# Patient Record
Sex: FEMALE | Race: BLACK OR AFRICAN AMERICAN | Employment: OTHER | URBAN - METROPOLITAN AREA
[De-identification: names, ages, dates, MRNs, and addresses within clinical notes are randomized per-mention and may not be internally consistent; named-entity substitution may affect disease eponyms.]

---

## 2017-06-28 ENCOUNTER — HOSPITAL ENCOUNTER (INPATIENT)
Age: 72
LOS: 3 days | Discharge: HOME | DRG: 286 | End: 2017-07-01
Attending: INTERNAL MEDICINE | Admitting: INTERNAL MEDICINE
Payer: MEDICARE

## 2017-06-28 ENCOUNTER — HOSPITAL ENCOUNTER (EMERGENCY)
Age: 72
Discharge: HOME | DRG: 286 | End: 2017-06-28
Attending: STUDENT IN AN ORGANIZED HEALTH CARE EDUCATION/TRAINING PROGRAM | Admitting: INTERNAL MEDICINE
Payer: MEDICARE

## 2017-06-28 ENCOUNTER — APPOINTMENT (OUTPATIENT)
Dept: GENERAL RADIOLOGY | Age: 72
DRG: 286 | End: 2017-06-28
Attending: STUDENT IN AN ORGANIZED HEALTH CARE EDUCATION/TRAINING PROGRAM
Payer: MEDICARE

## 2017-06-28 VITALS
DIASTOLIC BLOOD PRESSURE: 64 MMHG | HEART RATE: 72 BPM | OXYGEN SATURATION: 97 % | TEMPERATURE: 98.4 F | SYSTOLIC BLOOD PRESSURE: 129 MMHG | HEIGHT: 63 IN | BODY MASS INDEX: 26.4 KG/M2 | RESPIRATION RATE: 22 BRPM | WEIGHT: 149 LBS

## 2017-06-28 DIAGNOSIS — I42.0 DILATED CARDIOMYOPATHY (HCC): Chronic | ICD-10-CM

## 2017-06-28 DIAGNOSIS — R77.8 ELEVATED TROPONIN: Primary | ICD-10-CM

## 2017-06-28 DIAGNOSIS — I21.4 NSTEMI (NON-ST ELEVATED MYOCARDIAL INFARCTION) (HCC): Chronic | ICD-10-CM

## 2017-06-28 DIAGNOSIS — I50.23 ACUTE ON CHRONIC SYSTOLIC HEART FAILURE (HCC): Chronic | ICD-10-CM

## 2017-06-28 DIAGNOSIS — I48.21 PERMANENT ATRIAL FIBRILLATION (HCC): ICD-10-CM

## 2017-06-28 DIAGNOSIS — N18.9 CHRONIC RENAL FAILURE, UNSPECIFIED STAGE: Chronic | ICD-10-CM

## 2017-06-28 DIAGNOSIS — J44.9 CHRONIC OBSTRUCTIVE PULMONARY DISEASE, UNSPECIFIED COPD TYPE (HCC): Chronic | ICD-10-CM

## 2017-06-28 DIAGNOSIS — I50.9 ACUTE ON CHRONIC CONGESTIVE HEART FAILURE, UNSPECIFIED CONGESTIVE HEART FAILURE TYPE: ICD-10-CM

## 2017-06-28 PROBLEM — R07.9 CHEST PAIN: Status: ACTIVE | Noted: 2017-06-28

## 2017-06-28 LAB
ANION GAP BLD CALC-SCNC: 9 MMOL/L (ref 7–16)
APPEARANCE UR: NORMAL
APTT PPP: >110 SEC (ref 23.5–31.7)
ATRIAL RATE: 117 BPM
BASOPHILS # BLD AUTO: 0 K/UL (ref 0–0.2)
BASOPHILS # BLD: 0 % (ref 0–2)
BILIRUB UR QL: NEGATIVE
BNP SERPL-MCNC: 2055 PG/ML
BUN SERPL-MCNC: 22 MG/DL (ref 8–23)
CALCIUM SERPL-MCNC: 8.5 MG/DL (ref 8.3–10.4)
CALCULATED R AXIS, ECG10: 101 DEGREES
CALCULATED T AXIS, ECG11: -94 DEGREES
CHLORIDE SERPL-SCNC: 106 MMOL/L (ref 98–107)
CO2 SERPL-SCNC: 28 MMOL/L (ref 21–32)
COLOR UR: YELLOW
CREAT SERPL-MCNC: 1.14 MG/DL (ref 0.6–1)
DIAGNOSIS, 93000: NORMAL
DIFFERENTIAL METHOD BLD: ABNORMAL
EOSINOPHIL # BLD: 0.1 K/UL (ref 0–0.8)
EOSINOPHIL NFR BLD: 2 % (ref 0.5–7.8)
ERYTHROCYTE [DISTWIDTH] IN BLOOD BY AUTOMATED COUNT: 13.7 % (ref 11.9–14.6)
GLUCOSE SERPL-MCNC: 95 MG/DL (ref 65–100)
GLUCOSE UR STRIP.AUTO-MCNC: NEGATIVE MG/DL
HCT VFR BLD AUTO: 40.7 % (ref 35.8–46.3)
HGB BLD-MCNC: 12.8 G/DL (ref 11.7–15.4)
HGB UR QL STRIP: NEGATIVE
IMM GRANULOCYTES # BLD: 0 K/UL (ref 0–0.5)
IMM GRANULOCYTES NFR BLD AUTO: 0.4 % (ref 0–5)
INR PPP: 1.1 (ref 0.9–1.2)
KETONES UR QL STRIP.AUTO: NEGATIVE MG/DL
LEUKOCYTE ESTERASE UR QL STRIP.AUTO: NEGATIVE
LYMPHOCYTES # BLD AUTO: 30 % (ref 13–44)
LYMPHOCYTES # BLD: 2.3 K/UL (ref 0.5–4.6)
MCH RBC QN AUTO: 32.2 PG (ref 26.1–32.9)
MCHC RBC AUTO-ENTMCNC: 31.4 G/DL (ref 31.4–35)
MCV RBC AUTO: 102.5 FL (ref 79.6–97.8)
MONOCYTES # BLD: 0.6 K/UL (ref 0.1–1.3)
MONOCYTES NFR BLD AUTO: 7 % (ref 4–12)
NEUTS SEG # BLD: 4.6 K/UL (ref 1.7–8.2)
NEUTS SEG NFR BLD AUTO: 61 % (ref 43–78)
NITRITE UR QL STRIP.AUTO: NEGATIVE
PH UR STRIP: 6 [PH] (ref 5–9)
PLATELET # BLD AUTO: 214 K/UL (ref 150–450)
PMV BLD AUTO: 11.1 FL (ref 10.8–14.1)
POTASSIUM SERPL-SCNC: 5.5 MMOL/L (ref 3.5–5.1)
PROCALCITONIN SERPL-MCNC: 0.1 NG/ML
PROT UR STRIP-MCNC: NEGATIVE MG/DL
PROTHROMBIN TIME: 11.8 SEC (ref 9.6–12)
Q-T INTERVAL, ECG07: 524 MS
QRS DURATION, ECG06: 156 MS
QTC CALCULATION (BEZET), ECG08: 612 MS
RBC # BLD AUTO: 3.97 M/UL (ref 4.05–5.25)
SODIUM SERPL-SCNC: 143 MMOL/L (ref 136–145)
SP GR UR REFRACTOMETRY: 1.01 (ref 1–1.02)
TROPONIN I BLD-MCNC: 0.13 NG/ML (ref 0–0.08)
TROPONIN I SERPL-MCNC: 0.15 NG/ML (ref 0.02–0.05)
TROPONIN I SERPL-MCNC: 0.17 NG/ML (ref 0.02–0.05)
UROBILINOGEN UR QL STRIP.AUTO: 0.2 EU/DL (ref 0.2–1)
VENTRICULAR RATE, ECG03: 82 BPM
WBC # BLD AUTO: 7.7 K/UL (ref 4.3–11.1)

## 2017-06-28 PROCEDURE — 74011000250 HC RX REV CODE- 250: Performed by: NURSE PRACTITIONER

## 2017-06-28 PROCEDURE — 94640 AIRWAY INHALATION TREATMENT: CPT

## 2017-06-28 PROCEDURE — 74011250636 HC RX REV CODE- 250/636: Performed by: INTERNAL MEDICINE

## 2017-06-28 PROCEDURE — 74011250637 HC RX REV CODE- 250/637: Performed by: NURSE PRACTITIONER

## 2017-06-28 PROCEDURE — 65660000000 HC RM CCU STEPDOWN

## 2017-06-28 PROCEDURE — 84484 ASSAY OF TROPONIN QUANT: CPT | Performed by: INTERNAL MEDICINE

## 2017-06-28 PROCEDURE — 94760 N-INVAS EAR/PLS OXIMETRY 1: CPT

## 2017-06-28 PROCEDURE — 74011250636 HC RX REV CODE- 250/636: Performed by: NURSE PRACTITIONER

## 2017-06-28 PROCEDURE — 85730 THROMBOPLASTIN TIME PARTIAL: CPT | Performed by: INTERNAL MEDICINE

## 2017-06-28 PROCEDURE — 74011000250 HC RX REV CODE- 250: Performed by: INTERNAL MEDICINE

## 2017-06-28 PROCEDURE — C8929 TTE W OR WO FOL WCON,DOPPLER: HCPCS

## 2017-06-28 PROCEDURE — 77010033678 HC OXYGEN DAILY

## 2017-06-28 PROCEDURE — 36415 COLL VENOUS BLD VENIPUNCTURE: CPT | Performed by: INTERNAL MEDICINE

## 2017-06-28 PROCEDURE — 93005 ELECTROCARDIOGRAM TRACING: CPT | Performed by: INTERNAL MEDICINE

## 2017-06-28 RX ORDER — POTASSIUM CHLORIDE 750 MG/1
10 CAPSULE, EXTENDED RELEASE ORAL 2 TIMES DAILY
COMMUNITY

## 2017-06-28 RX ORDER — ACETAMINOPHEN 500 MG
1000 TABLET ORAL
Status: COMPLETED | OUTPATIENT
Start: 2017-06-28 | End: 2017-06-28

## 2017-06-28 RX ORDER — ALBUTEROL SULFATE 2.5 MG/.5ML
SOLUTION RESPIRATORY (INHALATION) ONCE
Status: ON HOLD | COMMUNITY
End: 2017-07-01

## 2017-06-28 RX ORDER — SODIUM CHLORIDE 0.9 % (FLUSH) 0.9 %
5-10 SYRINGE (ML) INJECTION AS NEEDED
Status: DISCONTINUED | OUTPATIENT
Start: 2017-06-28 | End: 2017-06-30 | Stop reason: SDUPTHER

## 2017-06-28 RX ORDER — METOPROLOL SUCCINATE 50 MG/1
50 TABLET, EXTENDED RELEASE ORAL DAILY
Status: DISCONTINUED | OUTPATIENT
Start: 2017-06-29 | End: 2017-07-01 | Stop reason: HOSPADM

## 2017-06-28 RX ORDER — HEPARIN SODIUM 5000 [USP'U]/100ML
12-25 INJECTION, SOLUTION INTRAVENOUS
Status: DISCONTINUED | OUTPATIENT
Start: 2017-06-28 | End: 2017-06-28

## 2017-06-28 RX ORDER — OXYCODONE HYDROCHLORIDE 5 MG/1
5 TABLET ORAL
Qty: 15 TAB | Refills: 0 | Status: SHIPPED | OUTPATIENT
Start: 2017-06-28

## 2017-06-28 RX ORDER — OXYCODONE HYDROCHLORIDE 5 MG/1
5 TABLET ORAL
Status: DISCONTINUED | OUTPATIENT
Start: 2017-06-28 | End: 2017-07-01 | Stop reason: HOSPADM

## 2017-06-28 RX ORDER — NITROGLYCERIN 0.4 MG/1
0.4 TABLET SUBLINGUAL
Status: COMPLETED | OUTPATIENT
Start: 2017-06-28 | End: 2017-06-28

## 2017-06-28 RX ORDER — BUDESONIDE 0.5 MG/2ML
INHALANT ORAL
Status: DISCONTINUED | OUTPATIENT
Start: 2017-06-28 | End: 2017-07-01 | Stop reason: HOSPADM

## 2017-06-28 RX ORDER — POTASSIUM CHLORIDE 750 MG/1
10 CAPSULE, EXTENDED RELEASE ORAL 2 TIMES DAILY
Status: DISCONTINUED | OUTPATIENT
Start: 2017-06-28 | End: 2017-06-28

## 2017-06-28 RX ORDER — METOPROLOL SUCCINATE 50 MG/1
50 TABLET, EXTENDED RELEASE ORAL DAILY
Status: ON HOLD | COMMUNITY
End: 2017-06-28

## 2017-06-28 RX ORDER — SODIUM CHLORIDE 0.9 % (FLUSH) 0.9 %
5-10 SYRINGE (ML) INJECTION EVERY 8 HOURS
Status: DISCONTINUED | OUTPATIENT
Start: 2017-06-28 | End: 2017-06-30 | Stop reason: SDUPTHER

## 2017-06-28 RX ORDER — FUROSEMIDE 40 MG/1
40 TABLET ORAL DAILY
COMMUNITY

## 2017-06-28 RX ORDER — GUAIFENESIN 100 MG/5ML
324 LIQUID (ML) ORAL
Status: COMPLETED | OUTPATIENT
Start: 2017-06-28 | End: 2017-06-28

## 2017-06-28 RX ORDER — HEPARIN SODIUM 5000 [USP'U]/100ML
12-25 INJECTION, SOLUTION INTRAVENOUS
Status: DISCONTINUED | OUTPATIENT
Start: 2017-06-28 | End: 2017-06-28 | Stop reason: HOSPADM

## 2017-06-28 RX ORDER — FUROSEMIDE 10 MG/ML
40 INJECTION INTRAMUSCULAR; INTRAVENOUS 2 TIMES DAILY
Status: DISCONTINUED | OUTPATIENT
Start: 2017-06-28 | End: 2017-06-29

## 2017-06-28 RX ORDER — BUDESONIDE AND FORMOTEROL FUMARATE DIHYDRATE 160; 4.5 UG/1; UG/1
2 AEROSOL RESPIRATORY (INHALATION) 2 TIMES DAILY
Status: ON HOLD | COMMUNITY
End: 2017-07-01

## 2017-06-28 RX ORDER — MORPHINE SULFATE 2 MG/ML
2 INJECTION, SOLUTION INTRAMUSCULAR; INTRAVENOUS
Status: DISCONTINUED | OUTPATIENT
Start: 2017-06-28 | End: 2017-07-01 | Stop reason: HOSPADM

## 2017-06-28 RX ORDER — FUROSEMIDE 10 MG/ML
40 INJECTION INTRAMUSCULAR; INTRAVENOUS
Status: COMPLETED | OUTPATIENT
Start: 2017-06-28 | End: 2017-06-28

## 2017-06-28 RX ORDER — FUROSEMIDE 40 MG/1
TABLET ORAL DAILY
COMMUNITY
End: 2017-06-28 | Stop reason: CLARIF

## 2017-06-28 RX ORDER — ALBUTEROL SULFATE 2.5 MG/.5ML
2.5 SOLUTION RESPIRATORY (INHALATION)
Status: DISCONTINUED | OUTPATIENT
Start: 2017-06-28 | End: 2017-06-28

## 2017-06-28 RX ORDER — HEPARIN SODIUM 5000 [USP'U]/100ML
12-25 INJECTION, SOLUTION INTRAVENOUS
Status: DISCONTINUED | OUTPATIENT
Start: 2017-06-28 | End: 2017-06-29

## 2017-06-28 RX ORDER — ALBUTEROL SULFATE 0.83 MG/ML
2.5 SOLUTION RESPIRATORY (INHALATION)
Status: DISCONTINUED | OUTPATIENT
Start: 2017-06-28 | End: 2017-06-30

## 2017-06-28 RX ORDER — HEPARIN SODIUM 5000 [USP'U]/ML
4000 INJECTION, SOLUTION INTRAVENOUS; SUBCUTANEOUS
Status: COMPLETED | OUTPATIENT
Start: 2017-06-28 | End: 2017-06-28

## 2017-06-28 RX ORDER — LIDOCAINE HYDROCHLORIDE 40 MG/ML
SOLUTION TOPICAL AS NEEDED
Status: DISCONTINUED | OUTPATIENT
Start: 2017-06-28 | End: 2017-06-28 | Stop reason: HOSPADM

## 2017-06-28 RX ORDER — PANTOPRAZOLE SODIUM 40 MG/1
40 TABLET, DELAYED RELEASE ORAL
Status: DISCONTINUED | OUTPATIENT
Start: 2017-06-29 | End: 2017-07-01 | Stop reason: HOSPADM

## 2017-06-28 RX ORDER — HYDROCODONE BITARTRATE AND ACETAMINOPHEN 5; 325 MG/1; MG/1
1 TABLET ORAL
COMMUNITY

## 2017-06-28 RX ORDER — ALBUTEROL SULFATE 90 UG/1
1 AEROSOL, METERED RESPIRATORY (INHALATION)
Status: ON HOLD | COMMUNITY
End: 2017-07-01

## 2017-06-28 RX ORDER — GUAIFENESIN 100 MG/5ML
81 LIQUID (ML) ORAL DAILY
Status: DISCONTINUED | OUTPATIENT
Start: 2017-06-29 | End: 2017-06-30

## 2017-06-28 RX ORDER — ATORVASTATIN CALCIUM 40 MG/1
40 TABLET, FILM COATED ORAL
Status: DISCONTINUED | OUTPATIENT
Start: 2017-06-28 | End: 2017-06-30

## 2017-06-28 RX ORDER — MEGESTROL ACETATE 40 MG/1
40 TABLET ORAL 4 TIMES DAILY
COMMUNITY
End: 2017-07-01

## 2017-06-28 RX ORDER — OMEPRAZOLE 20 MG/1
20 CAPSULE, DELAYED RELEASE ORAL DAILY
COMMUNITY

## 2017-06-28 RX ORDER — MEGESTROL ACETATE 40 MG/1
40 TABLET ORAL 4 TIMES DAILY
Status: DISCONTINUED | OUTPATIENT
Start: 2017-06-28 | End: 2017-06-28

## 2017-06-28 RX ORDER — METOPROLOL TARTRATE 50 MG/1
50 TABLET ORAL ONCE
COMMUNITY
End: 2017-07-01

## 2017-06-28 RX ORDER — ALBUTEROL SULFATE 90 UG/1
1 AEROSOL, METERED RESPIRATORY (INHALATION)
Status: DISCONTINUED | OUTPATIENT
Start: 2017-06-28 | End: 2017-07-01 | Stop reason: HOSPADM

## 2017-06-28 RX ORDER — IPRATROPIUM BROMIDE AND ALBUTEROL SULFATE 2.5; .5 MG/3ML; MG/3ML
3 SOLUTION RESPIRATORY (INHALATION)
Status: COMPLETED | OUTPATIENT
Start: 2017-06-28 | End: 2017-06-28

## 2017-06-28 RX ADMIN — ATORVASTATIN CALCIUM 40 MG: 40 TABLET, FILM COATED ORAL at 22:36

## 2017-06-28 RX ADMIN — METHYLPREDNISOLONE SODIUM SUCCINATE 125 MG: 125 INJECTION, POWDER, FOR SOLUTION INTRAMUSCULAR; INTRAVENOUS at 08:40

## 2017-06-28 RX ADMIN — ALBUTEROL SULFATE 2.5 MG: 2.5 SOLUTION RESPIRATORY (INHALATION) at 16:28

## 2017-06-28 RX ADMIN — NITROGLYCERIN 0.4 MG: 0.4 TABLET SUBLINGUAL at 09:27

## 2017-06-28 RX ADMIN — HEPARIN SODIUM AND DEXTROSE 12 UNITS/KG/HR: 5000; 5 INJECTION INTRAVENOUS at 11:10

## 2017-06-28 RX ADMIN — PERFLUTREN 1 ML: 6.52 INJECTION, SUSPENSION INTRAVENOUS at 14:00

## 2017-06-28 RX ADMIN — IPRATROPIUM BROMIDE AND ALBUTEROL SULFATE 3 ML: 2.5; .5 SOLUTION RESPIRATORY (INHALATION) at 07:45

## 2017-06-28 RX ADMIN — ACETAMINOPHEN 1000 MG: 500 TABLET, FILM COATED ORAL at 09:22

## 2017-06-28 RX ADMIN — NITROGLYCERIN 1 INCH: 20 OINTMENT TOPICAL at 17:32

## 2017-06-28 RX ADMIN — SACUBITRIL AND VALSARTAN 1 TABLET: 49; 51 TABLET, FILM COATED ORAL at 20:02

## 2017-06-28 RX ADMIN — ALBUTEROL SULFATE 2.5 MG: 2.5 SOLUTION RESPIRATORY (INHALATION) at 20:41

## 2017-06-28 RX ADMIN — HEPARIN SODIUM 4000 UNITS: 5000 INJECTION, SOLUTION INTRAVENOUS; SUBCUTANEOUS at 11:08

## 2017-06-28 RX ADMIN — NITROGLYCERIN 0.4 MG: 0.4 TABLET SUBLINGUAL at 09:21

## 2017-06-28 RX ADMIN — LIDOCAINE HYDROCHLORIDE: 40 SOLUTION TOPICAL at 08:10

## 2017-06-28 RX ADMIN — Medication 10 ML: at 11:50

## 2017-06-28 RX ADMIN — NITROGLYCERIN 0.4 MG: 0.4 TABLET SUBLINGUAL at 09:04

## 2017-06-28 RX ADMIN — ASPIRIN 81 MG 324 MG: 81 TABLET ORAL at 08:40

## 2017-06-28 RX ADMIN — FUROSEMIDE 40 MG: 10 INJECTION, SOLUTION INTRAMUSCULAR; INTRAVENOUS at 17:30

## 2017-06-28 RX ADMIN — BUDESONIDE 500 MCG: 0.5 SUSPENSION RESPIRATORY (INHALATION) at 20:41

## 2017-06-28 RX ADMIN — FUROSEMIDE 40 MG: 10 INJECTION, SOLUTION INTRAMUSCULAR; INTRAVENOUS at 09:22

## 2017-06-28 NOTE — ED PROVIDER NOTES
HPI     Past Medical History:   Diagnosis Date    CAD (coronary artery disease)     CHF     Chronic obstructive pulmonary disease ([facility] [address]     Diabetes ([facility] [address]     Hypertension     Ill-defined condition     Atrial Fibrillation     Ill-defined condition     Left Hemothorax     Ill-defined condition     Anemia        Past Surgical History:   Procedure Laterality Date    HX ORTHOPAEDIC      right foot    HX ORTHOPAEDIC      right hip replacement     HX PACEMAKER           History reviewed. No pertinent family history. Social History     Social History    Marital status:      Spouse name: N/A    Number of children: N/A    Years of education: N/A     Occupational History    Not on file. Social History Main Topics    Smoking status: Former Smoker    Smokeless tobacco: Never Used    Alcohol use No    Drug use: Not on file    Sexual activity: Not on file     Other Topics Concern    Not on file     Social History Narrative    No narrative on file         ALLERGIES: Aspirin    Review of Systems    Vitals:    06/28/17 0714 06/28/17 0745 06/28/17 0810 06/28/17 0904   BP: 118/72   126/64   Pulse: 78   79   Resp: 20      Temp: 98.1 °F (36.7 °C)      SpO2: 98% 97% 98%    Weight: 67.6 kg (149 lb)      Height: 5' 3\" (1.6 m)               Physical Exam     MDM  Number of Diagnoses or Management Options  Acute on chronic congestive heart failure, unspecified congestive heart failure type [facility]): new and requires workup  Elevated troponin: new and requires workup  Diagnosis management comments: Troponin elevation noted. EKG shows a paced rhythm without evidence of acute ischemic change. Occasional PVCs noted. BNP is also elevated consistent with patient's history of heart failure and likely exacerbation at this time. Chest x-ray shows cardiac enlargement but no acute failure, focal infiltrate or other abnormality.   Patient reports minimal improvement with DuoNeb therapy on arrival.  Lab reports slight hemolysis associated with mild potassium elevation. Discussed plan for admission with patient. Pending cardiology consult. 9:18 AM  Discussed patient case with on-call cardiologist to prefers patient be transferred to [facility] for further evaluation and admission. Requested initiation of heparin bolus at this time. Plenty care with patient to voices understanding and agreement.          Amount and/or Complexity of Data Reviewed  Clinical lab tests: ordered and reviewed  Tests in the radiology section of CPT®: ordered and reviewed  Tests in the medicine section of CPT®: ordered and reviewed  Independent visualization of images, tracings, or specimens: yes    Risk of Complications, Morbidity, and/or Mortality  Presenting problems: high  Diagnostic procedures: high  Management options: high    Patient Progress  Patient progress: stable    ED Course       Procedures

## 2017-06-28 NOTE — PROGRESS NOTES
Patient to room 320 from [facility]. Patient transferred bed via ambulation. Patient placed in gown and monitor leads applied. Weight taken. Vital signs stable and no acute distress noted. Gtts and lines verified and chart reviewed. Heparin dual verified at 12 units/ 16.2ml/hr. Care assumed of patient. Pt is alert and oriented x4 and follows commands appropriately. Pt reports some mild difficulty breathing and some generalized CP. LS reveal some bilateral lower crackles. EKG ordered and Mahin Ryan NP paged    Dual skin assessment with Bella Dsouza RN. Skin is intact with no breakdown noted. Heels and sacrum intact. Brusiing to bilateral arms noted. Pt repositioned in bed and turns self appropriately.

## 2017-06-28 NOTE — IP AVS SNAPSHOT
Arlyss Drought 
 
 
 145 [facility] 322 [facility] 
[phone] Patient: Maame Pérez MRN: [id number] You are allergic to the following Allergen Reactions Aspirin Nausea and Vomiting Recent Documentation Height Weight BMI OB Status Smoking Status 1.6 m 70.5 kg 27.55 kg/m2 Postmenopausal Former Smoker Emergency Contacts Name Discharge Info Relation Home Work Mobile Excell Libman  Daughter [21] [phone] About your hospitalization You were admitted on:  June 28, 2017 You last received care in the:  [facility] 3 TELEMETRY You were discharged on:  July 1, 2017 Unit phone number:  [phone] Why you were hospitalized Your primary diagnosis was:  Nstemi (Non-St Elevated Myocardial Infarction) (Hcc) Your diagnoses also included:  Diabetes (Hcc), Cardiomyopathy (Hcc), Copd (Chronic Obstructive Pulmonary Disease) (Hcc), Atrial Fibrillation (Hcc), Systolic Heart Failure (Hcc), Chest Pain, Chronic Renal Failure Providers Seen During Your Hospitalizations Provider Role Specialty Primary office phone Chepe León MD Attending Provider Cardiology [phone] Your Primary Care Physician (PCP) Primary Care Physician Office Phone Office Fax NONE ** None ** ** None ** Follow-up Information Follow up With Details Comments Contact Info Celeste cardiology Call 1-2 weeks with her regular cardiologists Dr. Ekta Jackman Pulmonologist  Call [facility] follow up, Please call office for appointment as soon as possible In [place] Current Discharge Medication List  
  
START taking these medications Dose & Instructions Dispensing Information Comments Morning Noon Evening Bedtime  
 metoprolol succinate 50 mg XL tablet Commonly known as:  TOPROL-XL Dose:  50 mg Take 1 Tab by mouth daily. Quantity:  30 Tab Refills:  11  
     
  
   
   
   
  
 predniSONE 10 mg tablet Commonly known as:  Oneyda Look Dose:  10 mg Take 1 Tab by mouth daily (with breakfast). 4 tabs x 3 days then 3 tabs x 3 days then 2 tabs x 3 days then 1 tab x 3 days then stop med Quantity:  30 Tab Refills:  0 CONTINUE these medications which have CHANGED Dose & Instructions Dispensing Information Comments Morning Noon Evening Bedtime * albuterol 90 mcg/actuation inhaler Commonly known as:  VENTOLIN HFA What changed:  Another medication with the same name was changed. Make sure you understand how and when to take each. Notes to Patient:  AS NEEDED Dose:  1 Puff Take 1 Puff by inhalation every four (4) hours as needed. Quantity:  1 Inhaler Refills:  5  
     
   
   
   
  
 * albuterol sulfate 2.5 mg/0.5 mL Nebu nebulizer solution Commonly known as:  PROVENTIL;VENTOLIN What changed:   
- how much to take - when to take this Dose:  2.5 mg  
0.5 mL by Nebulization route three (3) times daily for 30 days. Quantity:  45 mL Refills:  1  
     
  
   
  
   
   
  
  
 tiotropium 18 mcg inhalation capsule Commonly known as:  Juv AcessÃ³rios What changed:  Another medication with the same name was removed. Continue taking this medication, and follow the directions you see here. Dose:  1 Cap Take 1 Cap by inhalation daily. Quantity:  30 Cap Refills:  5  
     
  
   
   
   
  
 * Notice: This list has 2 medication(s) that are the same as other medications prescribed for you. Read the directions carefully, and ask your doctor or other care provider to review them with you. CONTINUE these medications which have NOT CHANGED Dose & Instructions Dispensing Information Comments Morning Noon Evening Bedtime  
 budesonide-formoterol 160-4.5 mcg/actuation HFA inhaler Commonly known as:  SYMBICORT  
   
 Dose:  2 Puff Take 2 Puffs by inhalation two (2) times a day. Quantity:  1 Inhaler Refills:  5 ELIQUIS 5 mg tablet Generic drug:  apixaban Dose:  5 mg Take 5 mg by mouth two (2) times a day. Refills:  0 ENTRESTO 49-51 mg Tab tablet Generic drug:  sacubitril-valsartan Dose:  1 Tab Take 1 Tab by mouth two (2) times a day. Refills:  0 HYDROcodone-acetaminophen 5-325 mg per tablet Commonly known as:  Kevon Bernal Notes to Patient:  AS NEEDED Dose:  1 Tab Take 1 Tab by mouth every six (6) hours as needed for Pain. Refills:  0  
     
   
   
   
  
 LASIX 40 mg tablet Generic drug:  furosemide Dose:  40 mg Take 40 mg by mouth daily. Refills:  0  
     
  
   
   
   
  
 omeprazole 20 mg capsule Commonly known as:  PRILOSEC Dose:  20 mg Take 20 mg by mouth daily. Take one capsule every day before meals Refills:  0  
     
  
   
   
   
  
 oxyCODONE IR 5 mg immediate release tablet Commonly known as:  Yolanda Lopez Notes to Patient:  AS NEEDED Dose:  5 mg Take 1 Tab by mouth every four (4) hours as needed for Pain for up to 15 doses. Max Daily Amount: 30 mg.  
 Quantity:  15 Tab Refills:  0  
     
   
   
   
  
 potassium chloride SA 10 mEq capsule Commonly known as:  Derek Novak Dose:  10 mEq Take 10 mEq by mouth two (2) times a day. Indications: take 2 tablest 2 times a day Refills:  0 STOP taking these medications   
 megestrol 40 mg tablet Commonly known as:  MEGACE  
   
  
 metoprolol tartrate 50 mg tablet Commonly known as:  LOPRESSOR Where to Get Your Medications Information on where to get these meds will be given to you by the nurse or doctor. ! Ask your nurse or doctor about these medications  
  albuterol 90 mcg/actuation inhaler albuterol sulfate 2.5 mg/0.5 mL Nebu nebulizer solution  
 budesonide-formoterol 160-4.5 mcg/actuation HFA inhaler  
 metoprolol succinate 50 mg XL tablet  
 predniSONE 10 mg tablet  
 tiotropium 18 mcg inhalation capsule Discharge Instructions Cardiac Catheterization/Angiography Discharge Instructions *Check the puncture site frequently for swelling or bleeding. If you see any bleeding, lie down and apply pressure over the area with a clean town or washcloth. Notify your doctor for any redness, swelling, drainage or oozing from the puncture site. Notify your doctor for any fever or chills. *If the leg or arm with the puncture becomes cold, numb or painful, call [facility] Cardiology at [phone] *Activity should be limited for the next 48 hours. Climb stairs as little as possible and avoid any stooping, bending or strenuous activity for 48 hours. No heavy lifting (anything over 10 pounds) for three days. *Do not drive for 48 hours. *You may resume your usual diet. Drink more fluids than usual. 
 
*Have a responsible person drive you home and stay with you for at least 24 hours after your heart catheterization/angiography. *You may remove the bandage from your Right Wrist in 24 hours. You may shower in 24 hours. No tub baths, hot tubs or swimming for one week. Do not place any lotions, creams, powders, ointments over the puncture site for one week. You may place a clean band-aid over the puncture site each day for 5 days. Change this daily. Chronic Obstructive Pulmonary Disease (COPD): Care Instructions Your Care Instructions Chronic obstructive pulmonary disease (COPD) is a general term for a group of lung diseases, including emphysema and chronic bronchitis. People with COPD have decreased airflow in and out of the lungs, which makes it hard to breathe. The airways also can get clogged with thick mucus. Cigarette smoking is a major cause of COPD. Although there is no cure for COPD, you can slow its progress. Following your treatment plan and taking care of yourself can help you feel better and live longer. Follow-up care is a key part of your treatment and safety. Be sure to make and go to all appointments, and call your doctor if you are having problems. It's also a good idea to know your test results and keep a list of the medicines you take. How can you care for yourself at home? Staying healthy · Do not smoke. This is the most important step you can take to prevent more damage to your lungs. If you need help quitting, talk to your doctor about stop-smoking programs and medicines. These can increase your chances of quitting for good. · Avoid colds and flu. Get a pneumococcal vaccine shot. If you have had one before, ask your doctor whether you need a second dose. Get the flu vaccine every fall. If you must be around people with colds or the flu, wash your hands often. · Avoid secondhand smoke, air pollution, and high altitudes. Also avoid cold, dry air and hot, humid air. Stay at home with your windows closed when air pollution is bad. Medicines and oxygen therapy · Take your medicines exactly as prescribed. Call your doctor if you think you are having a problem with your medicine. · You may be taking medicines such as: ¨ Bronchodilators. These help open your airways and make breathing easier. Bronchodilators are either short-acting (work for 6 to 9 hours) or long-acting (work for 24 hours). You inhale most bronchodilators, so they start to act quickly. Always carry your quick-relief inhaler with you in case you need it while you are away from home. ¨ Corticosteroids (prednisone, budesonide). These reduce airway inflammation. They come in pill or inhaled form. You must take these medicines every day for them to work well. · A spacer may help you get more inhaled medicine to your lungs.  Ask your doctor or pharmacist if a spacer is right for you. If it is, ask how to use it properly. · Do not take any vitamins, over-the-counter medicine, or herbal products without talking to your doctor first. 
· If your doctor prescribed antibiotics, take them as directed. Do not stop taking them just because you feel better. You need to take the full course of antibiotics. · Oxygen therapy boosts the amount of oxygen in your blood and helps you breathe easier. Use the flow rate your doctor has recommended, and do not change it without talking to your doctor first. 
Activity · Get regular exercise. Walking is an easy way to get exercise. Start out slowly, and walk a little more each day. · Pay attention to your breathing. You are exercising too hard if you cannot talk while you are exercising. · Take short rest breaks when doing household chores and other activities. · Learn breathing methodssuch as breathing through pursed lipsto help you become less short of breath. · If your doctor has not set you up with a pulmonary rehabilitation program, talk to him or her about whether rehab is right for you. Rehab includes exercise programs, education about your disease and how to manage it, help with diet and other changes, and emotional support. Diet · Eat regular, healthy meals. Use bronchodilators about 1 hour before you eat to make it easier to eat. Eat several small meals instead of three large ones. Drink beverages at the end of the meal. Avoid foods that are hard to chew. · Eat foods that contain protein so that you do not lose muscle mass. · Talk with your doctor if you gain too much weight or if you lose weight without trying. Mental health · Talk to your family, friends, or a therapist about your feelings. It is normal to feel frightened, angry, hopeless, helpless, and even guilty. Talking openly about bad feelings can help you cope. If these feelings last, talk to your doctor. When should you call for help? Call 911 anytime you think you may need emergency care. For example, call if: 
· You have severe trouble breathing. Call your doctor now or seek immediate medical care if: 
· You have new or worse trouble breathing. · You cough up blood. · You have a fever. Watch closely for changes in your health, and be sure to contact your doctor if: 
· You cough more deeply or more often, especially if you notice more mucus or a change in the color of your mucus. · You have new or worse swelling in your legs or belly. · You are not getting better as expected. Where can you learn more? Go to http://billy-braulio.info/. Maria Del Carmen Mejias in the search box to learn more about \"Chronic Obstructive Pulmonary Disease (COPD): Care Instructions. \" Current as of: March 25, 2017 Content Version: 11.3 © 0253-7540 [employer]. Care instructions adapted under license by [employer] (which disclaims liability or warranty for this information). If you have questions about a medical condition or this instruction, always ask your healthcare professional. [address] any warranty or liability for your use of this information. Dilated Cardiomyopathy: Care Instructions Your Care Instructions Dilated cardiomyopathy is a condition that weakens your heart muscle and causes it to stretch, or dilate. When your heart muscle is weak, it can't pump out blood as well as it should. More blood stays in your heart after each heartbeat. As more blood fills and stays in the heart, the heart muscle stretches even more and gets even weaker. Many things can cause dilated cardiomyopathy. It can be caused by another disease or condition, such as high blood pressure or a heart attack. Some people have a family history of dilated cardiomyopathy. For some people, the cause is not known.  
You may not have any symptoms at first. Or you may have mild symptoms, such as feeling very tired or weak. If your heart gets weaker, you may develop heart failure. Heart failure means that your heart muscle doesn't pump as much blood as your body needs. If this happens, you will feel other symptoms such as shortness of breath or trouble breathing when you lie down. The goal of treatment is to slow the disease and help you feel better. You may also have treatment for the cause of the cardiomyopathy. You will probably take a few medicines. If your doctor thinks it will help your heart and prevent problems, you may get a device such as a pacemaker. Self-care is another important part of your treatment. It includes the things you can do every day to feel better and stay as healthy as possible. Follow-up care is a key part of your treatment and safety. Be sure to make and go to all appointments, and call your doctor if you are having problems. It's also a good idea to know your test results and keep a list of the medicines you take. How can you care for yourself at home? Medicines · Be safe with medicines. Take your medicines exactly as prescribed. Call your doctor if you think you are having a problem with your medicine. You may be taking some of the following medicines: ¨ Angiotensin-converting enzyme (ACE) inhibitors or angiotensin II receptor blockers (ARBs). These make it easier for blood to flow. ¨ Diuretics. These help remove excess fluid from the body. ¨ Beta-blockers. These slow the heart rate and can help the heart fill with blood more completely. Heart-healthy lifestyle · Be active. Exercise regularly, but don't exercise too hard. If you aren't already active, your doctor may want you to start exercising. But don't start until you have talked with your doctor to make an exercise program that is safe for you. · Do not smoke. Smoking can make a heart condition worse. If you need help quitting, talk to your doctor about stop-smoking programs and medicines. These can increase your chances of quitting for good. · Eat a heart-healthy diet. · Stay at a healthy weight. Lose weight if you need to. · If your doctor recommends it, limit sodium. This helps keep fluid from building up in your body. It may help you feel better. Weight monitoring · Weigh yourself without clothing at the same time each day. Record your weight. Call your doctor if you have a sudden weight gain, such as more than 2 to 3 pounds in a day or 5 pounds in a week. (Your doctor may suggest a different range of weight gain.) A sudden weight gain may mean that your condition is getting worse. When should you call for help? Call 911 anytime you think you may need emergency care. For example, call if: 
· You have symptoms of sudden heart failure. These may include: ¨ Severe trouble breathing. ¨ A fast or irregular heartbeat. ¨ Coughing up pink, foamy mucus. ¨ Passing out. Call your doctor now or seek immediate medical care if: 
· You have new or changed symptoms of heart failure, such as: ¨ New or increased shortness of breath. ¨ New or worse swelling in your legs, ankles, or feet. ¨ Sudden weight gain, such as more than 2 to 3 pounds in a day or 5 pounds in a week. (Your doctor may suggest a different range of weight gain.) ¨ Feeling dizzy or lightheaded or like you may faint. ¨ Feeling so tired or weak that you cannot do your usual activities. ¨ Not sleeping well. Shortness of breath wakes you at night. You need extra pillows to prop yourself up to breathe easier. Watch closely for changes in your health, and be sure to contact your doctor if you have any problems. Where can you learn more? Go to http://billy-braulio.info/. Enter B164 in the search box to learn more about \"Dilated Cardiomyopathy: Care Instructions. \" Current as of: March 17, 2017 Content Version: 11.3 © 2719-1732 [employer].  Care instructions adapted under license by [address] (which disclaims liability or warranty for this information). If you have questions about a medical condition or this instruction, always ask your healthcare professional. [address] any warranty or liability for your use of this information. Avoiding Triggers With Heart Failure: Care Instructions Your Care Instructions Triggers are anything that make your heart failure flare up. A flare-up is also called \"sudden heart failure\" or \"acute heart failure. \" When you have a flare-up, fluid builds up in your lungs, and you have problems breathing. You might need to go to the hospital. By watching for changes in your condition and avoiding triggers, you can prevent heart failure flare-ups. Follow-up care is a key part of your treatment and safety. Be sure to make and go to all appointments, and call your doctor if you are having problems. It's also a good idea to know your test results and keep a list of the medicines you take. How can you care for yourself at home? Watch for changes in your weight and condition · Weigh yourself without clothing at the same time each day. Record your weight. Call your doctor if you have sudden weight gain, such as more than 2 to 3 pounds in a day or 5 pounds in a week. (Your doctor may suggest a different range of weight gain.) A sudden weight gain may mean that your heart failure is getting worse. · Keep a daily record of your symptoms. Write down any changes in how you feel, such as new shortness of breath, cough, or problems eating. Also record if your ankles are more swollen than usual and if you feel more tired than usual. Note anything that you ate or did that could have triggered these changes. Limit sodium Sodium causes your body to hold on to extra water. This may cause your heart failure symptoms to get worse.  People get most of their sodium from processed foods. Fast food and restaurant meals also tend to be very high in sodium. · Your doctor may suggest that you limit sodium to 2,000 milligrams (mg) a day or less. That is less than 1 teaspoon of salt a day, including all the salt you eat in cooking or in packaged foods. · Read food labels on cans and food packages. They tell you how much sodium you get in one serving. Check the serving size. If you eat more than one serving, you are getting more sodium. · Be aware that sodium can come in forms other than salt, including monosodium glutamate (MSG), sodium citrate, and sodium bicarbonate (baking soda). MSG is often added to Asian food. You can sometimes ask for food without MSG or salt. · Slowly reducing salt will help you adjust to the taste. Take the salt shaker off the table. · Flavor your food with garlic, lemon juice, onion, vinegar, herbs, and spices instead of salt. Do not use soy sauce, steak sauce, onion salt, garlic salt, mustard, or ketchup on your food, unless it is labeled \"low-sodium\" or \"low-salt. \" 
· Make your own salad dressings, sauces, and ketchup without adding salt. · Use fresh or frozen ingredients, instead of canned ones, whenever you can. Choose low-sodium canned goods. · Eat less processed food and food from restaurants, including fast food. Exercise as directed Moderate, regular exercise is very good for your heart. It improves your blood flow and helps control your weight. But too much exercise can stress your heart and cause a heart failure flare-up. · Check with your doctor before you start an exercise program. 
· Walking is an easy way to get exercise. Start out slowly. Gradually increase the length and pace of your walk. Swimming, riding a bike, and using a treadmill are also good forms of exercise. · When you exercise, watch for signs that your heart is working too hard.  You are pushing yourself too hard if you cannot talk while you are exercising. If you become short of breath or dizzy or have chest pain, stop, sit down, and rest. 
· Do not exercise when you do not feel well. Take medicines correctly · Take your medicines exactly as prescribed. Call your doctor if you think you are having a problem with your medicine. · Make a list of all the medicines you take. Include those prescribed to you by other doctors and any over-the-counter medicines, vitamins, or supplements you take. Take this list with you when you go to any doctor. · Take your medicines at the same time every day. It may help you to post a list of all the medicines you take every day and what time of day you take them. · Make taking your medicine as simple as you can. Plan times to take your medicines when you are doing other things, such as eating a meal or getting ready for bed. This will make it easier to remember to take your medicines. · Get organized. Use helpful tools, such as daily or weekly pill containers. When should you call for help? Call 911 if you have symptoms of sudden heart failure such as: 
· You have severe trouble breathing. · You cough up pink, foamy mucus. · You have a new irregular or rapid heartbeat. Call your doctor now or seek immediate medical care if: 
· You have new or increased shortness of breath. · You are dizzy or lightheaded, or you feel like you may faint. · You have sudden weight gain, such as more than 2 to 3 pounds in a day or 5 pounds in a week. (Your doctor may suggest a different range of weight gain.) · You have increased swelling in your legs, ankles, or feet. · You are suddenly so tired or weak that you cannot do your usual activities. Watch closely for changes in your health, and be sure to contact your doctor if you develop new symptoms. Where can you learn more? Go to http://billy-braulio.info/.  
Enter C291 in the search box to learn more about \"Avoiding Triggers With Heart Failure: Care Instructions. \" Current as of: February 23, 2017 Content Version: 11.3 © 9377-2594 [employer]. Care instructions adapted under license by [employer] (which disclaims liability or warranty for this information). If you have questions about a medical condition or this instruction, always ask your healthcare professional. [facility] [address] any warranty or liability for your use of this information. DISCHARGE SUMMARY from Nurse The following personal items are in your possession at time of discharge: 
 
Dental Appliances: None Visual Aid: Glasses, With patient Home Medications: None Jewelry: Bracelet, Other (comment), Earrings (Ankle braclet ) Clothing: Pants, Slippers, Socks, With patient Other Valuables: Cell Phone PATIENT INSTRUCTIONS: 
 
 
F-face looks uneven A-arms unable to move or move unevenly S-speech slurred or non-existent T-time-call 911 as soon as signs and symptoms begin-DO NOT go Back to bed or wait to see if you get better-TIME IS BRAIN. Warning Signs of HEART ATTACK Call 911 if you have these symptoms: 
? Chest discomfort. Most heart attacks involve discomfort in the center of the chest that lasts more than a few minutes, or that goes away and comes back. It can feel like uncomfortable pressure, squeezing, fullness, or pain. ? Discomfort in other areas of the upper body. Symptoms can include pain or discomfort in one or both arms, the back, neck, jaw, or stomach. ? Shortness of breath with or without chest discomfort. ? Other signs may include breaking out in a cold sweat, nausea, or lightheadedness. Don't wait more than five minutes to call [address]! Fast action can save your life. Calling 911 is almost always the fastest way to get lifesaving treatment.  Emergency Medical Services staff can begin treatment when they arrive  up to an hour sooner than if someone gets to the hospital by car. The discharge information has been reviewed with the patient. The patient verbalized understanding. Discharge medications reviewed with the patient and appropriate educational materials and side effects teaching were provided. Discharge Orders None O-CODES Announcement We are excited to announce that we are making your provider's discharge notes available to you in O-CODES. You will see these notes when they are completed and signed by the physician that discharged you from your recent hospital stay. If you have any questions or concerns about any information you see in O-CODES, please call the Health Information Department where you were seen or reach out to your Primary Care Provider for more information about your plan of care. Introducing [facility]! Johann Arceo introduces O-CODES patient portal. Now you can access parts of your medical record, email your doctor's office, and request medication refills online. 1. In your internet browser, go to [email or web address] 2. Click on the First Time User? Click Here link in the Sign In box. You will see the New Member Sign Up page. 3. Enter your O-CODES Access Code exactly as it appears below. You will not need to use this code after youve completed the sign-up process. If you do not sign up before the expiration date, you must request a new code. · O-CODES Access Code: [email or web address] Expires: 9/26/2017 11:52 AM 
 
4. Enter the last four digits of your Social Security Number (xxxx) and Date of Birth (mm/dd/yyyy) as indicated and click Submit. You will be taken to the next sign-up page. 5. Create a Healthcare [employer] ID. This will be your O-CODES login ID and cannot be changed, so think of one that is secure and easy to remember. 6. Create a O-CODES password. You can change your password at any time. 7. Enter your Password Reset Question and Answer. This can be used at a later time if you forget your password. 8. Enter your e-mail address. You will receive e-mail notification when new information is available in [address] 9. Click Sign Up. You can now view and download portions of your medical record. 10. Click the Download Summary menu link to download a portable copy of your medical information. If you have questions, please visit the Frequently Asked Questions section of the [employer] website. Remember, [employer] is NOT to be used for urgent needs. For medical emergencies, dial 911. Now available from your iPhone and Android! General Information Please provide this summary of care documentation to your next provider. Patient Signature:  ____________________________________________________________ Date:  ____________________________________________________________  
  
Rhina Covarrubias Provider Signature:  ____________________________________________________________ Date:  ____________________________________________________________

## 2017-06-28 NOTE — IP AVS SNAPSHOT
Current Discharge Medication List  
  
START taking these medications Dose & Instructions Dispensing Information Comments Morning Noon Evening Bedtime  
 metoprolol succinate 50 mg XL tablet Commonly known as:  TOPROL-XL Dose:  50 mg Take 1 Tab by mouth daily. Quantity:  30 Tab Refills:  11  
     
  
   
   
   
  
 predniSONE 10 mg tablet Commonly known as:  Jessy Smoker Dose:  10 mg Take 1 Tab by mouth daily (with breakfast). 4 tabs x 3 days then 3 tabs x 3 days then 2 tabs x 3 days then 1 tab x 3 days then stop med Quantity:  30 Tab Refills:  0 CONTINUE these medications which have CHANGED Dose & Instructions Dispensing Information Comments Morning Noon Evening Bedtime * albuterol 90 mcg/actuation inhaler Commonly known as:  VENTOLIN HFA What changed:  Another medication with the same name was changed. Make sure you understand how and when to take each. Notes to Patient:  AS NEEDED Dose:  1 Puff Take 1 Puff by inhalation every four (4) hours as needed. Quantity:  1 Inhaler Refills:  5  
     
   
   
   
  
 * albuterol sulfate 2.5 mg/0.5 mL Nebu nebulizer solution Commonly known as:  PROVENTIL;VENTOLIN What changed:   
- how much to take - when to take this Dose:  2.5 mg  
0.5 mL by Nebulization route three (3) times daily for 30 days. Quantity:  45 mL Refills:  1  
     
  
   
  
   
   
  
  
 tiotropium 18 mcg inhalation capsule Commonly known as:  Mailana What changed:  Another medication with the same name was removed. Continue taking this medication, and follow the directions you see here. Dose:  1 Cap Take 1 Cap by inhalation daily. Quantity:  30 Cap Refills:  5  
     
  
   
   
   
  
 * Notice: This list has 2 medication(s) that are the same as other medications prescribed for you.  Read the directions carefully, and ask your doctor or other care provider to review them with you. CONTINUE these medications which have NOT CHANGED Dose & Instructions Dispensing Information Comments Morning Noon Evening Bedtime  
 budesonide-formoterol 160-4.5 mcg/actuation HFA inhaler Commonly known as:  SYMBICORT Dose:  2 Puff Take 2 Puffs by inhalation two (2) times a day. Quantity:  1 Inhaler Refills:  5 ELIQUIS 5 mg tablet Generic drug:  apixaban Dose:  5 mg Take 5 mg by mouth two (2) times a day. Refills:  0 ENTRESTO 49-51 mg Tab tablet Generic drug:  sacubitril-valsartan Dose:  1 Tab Take 1 Tab by mouth two (2) times a day. Refills:  0 HYDROcodone-acetaminophen 5-325 mg per tablet Commonly known as:  Ailin Yen Notes to Patient:  AS NEEDED Dose:  1 Tab Take 1 Tab by mouth every six (6) hours as needed for Pain. Refills:  0  
     
   
   
   
  
 LASIX 40 mg tablet Generic drug:  furosemide Dose:  40 mg Take 40 mg by mouth daily. Refills:  0  
     
  
   
   
   
  
 omeprazole 20 mg capsule Commonly known as:  PRILOSEC Dose:  20 mg Take 20 mg by mouth daily. Take one capsule every day before meals Refills:  0  
     
  
   
   
   
  
 oxyCODONE IR 5 mg immediate release tablet Commonly known as:  Juan Jose Wilburn Notes to Patient:  AS NEEDED Dose:  5 mg Take 1 Tab by mouth every four (4) hours as needed for Pain for up to 15 doses. Max Daily Amount: 30 mg.  
 Quantity:  15 Tab Refills:  0  
     
   
   
   
  
 potassium chloride SA 10 mEq capsule Commonly known as:  Cj Sara Dose:  10 mEq Take 10 mEq by mouth two (2) times a day. Indications: take 2 tablest 2 times a day Refills:  0 STOP taking these medications   
 megestrol 40 mg tablet Commonly known as:  MEGACE  
   
  
 metoprolol tartrate 50 mg tablet Commonly known as:  LOPRESSOR Where to Get Your Medications Information on where to get these meds will be given to you by the nurse or doctor. ! Ask your nurse or doctor about these medications  
  albuterol 90 mcg/actuation inhaler  
 albuterol sulfate 2.5 mg/0.5 mL Nebu nebulizer solution  
 budesonide-formoterol 160-4.5 mcg/actuation HFA inhaler  
 metoprolol succinate 50 mg XL tablet  
 predniSONE 10 mg tablet  
 tiotropium 18 mcg inhalation capsule

## 2017-06-28 NOTE — PROGRESS NOTES
Problem: Falls - Risk of  Goal: *Absence of falls  Outcome: Progressing Towards Goal  Pt progressing towards goal. No falls since admission. Bed low and locked. Call light within reach. Side rails x 2. Gripper socks applied. Personal belongings within reach. Pt verbalizes understanding to call for assistance. Problem: Unstable angina/NSTEMI: Day of Admission/Day 1  Goal: *Optimal pain control at patients stated goal  Outcome: Progressing Towards Goal  CP free following supportive care.

## 2017-06-28 NOTE — ROUTINE PROCESS
TRANSFER - OUT REPORT:    Verbal report given to Antonio Santana RN (name) on Omayra Medrano  being [facility] room [facility] (unit) for routine progression of care       Report consisted of patients Situation, Background, Assessment and   Recommendations(SBAR). Information from the following report(s) ED Summary was reviewed with the receiving nurse. Lines:   Peripheral IV 06/28/17 Right Forearm (Active)       Peripheral IV 06/28/17 Right Antecubital (Active)        Opportunity for questions and clarification was provided.       Patient transported with:   [facility] with cardiac monitor, O2 @3L/min, Heparin infusion

## 2017-06-28 NOTE — ED NOTES
Received critical lab value from lab at [place], called [place] and talked to Ehsan Boss RN and advised her that the troponin was called back at 0.17.

## 2017-06-28 NOTE — H&P
[facility] CARDIOLOGY History &Physical                 Primary Cardiologist: In [place] possible [facility] Dr. Bambi Melendez    Primary Care Physician: In [place]    Admitting Physician: Dr. Amelia Clark:     Patient is a 73 y.o. female with prior h/o HTN,  A. Fib, cardiomyopathy (LHC several years ago but no intervention needed per patient but no records available) has [employer] ICD implanted approx 1 year ago, lung issues in past and COPD, and OA. Patient lives in [place] and visit her dgt in [place] when she developed SOB with cough, chills, diaphoresis that started approx 2 weeks ago. She thought she had a \"cold\". She reports increasing her daily lasix but no help, then today sx worse so went to ED at [place] for further treatment. She reports unable to lie flat for years but worse over last several days. She reports her chest pressure started after coughing spell on scale 10/10 post NTG 3/10. Now with laying still sx resolved. No weight gain or lower ext edema. In ED, labs showed initial trop . 13 now . 17 with BNP 2055. Past Medical History:   Diagnosis Date    Arthritis     Atrial fibrillation ([address]     Cardiomyopathy ([address]     Chronic obstructive pulmonary disease (HCC)     [facility]     Hypertension     Ill-defined condition     Anemia     Systolic heart failure (HCC)       Past Surgical History:   Procedure Laterality Date    HX IMPLANTABLE CARDIOVERTER DEFIBRILLATOR      HX ORTHOPAEDIC      right foot    HX ORTHOPAEDIC      right hip replacement     HX SHOULDER ARTHROSCOPY        Allergies   Allergen Reactions    Aspirin Nausea and Vomiting     Social History   Substance Use Topics    Smoking status: Former Smoker    Smokeless tobacco: Never Used    Alcohol use No      FH: No family history on file. Review of Systems   Constitution: Positive for chills and diaphoresis.  Negative for fever, weakness, malaise/fatigue, weight gain and weight loss. HENT: Negative for congestion and headaches. Cardiovascular: Positive for chest pain. Negative for claudication, cyanosis, dyspnea on exertion, irregular heartbeat, leg swelling, near-syncope, orthopnea, palpitations, paroxysmal nocturnal dyspnea and syncope. Respiratory: Positive for cough, shortness of breath and wheezing. Endocrine: Negative for cold intolerance and heat intolerance. Hematologic/Lymphatic: Negative for bleeding problem. Bruises/bleeds easily. Skin: Negative for nail changes. Neurological: Negative for dizziness.      ROS      Objective:       Visit Vitals    /62 (BP 1 Location: Left arm, BP Patient Position: At rest)    Pulse 72    Temp 97 °F (36.1 °C)    Resp 21    Ht 5' 3\" (1.6 m)    Wt 70.2 kg (154 lb 11.2 oz)    SpO2 100%    BMI 27.4 kg/m2               Physical Exam:  General: Well Developed, Well Nourished, No Acute Distress  HEENT: pupils equal and round, no abnormalities noted  Neck: supple, no JVD, no carotid bruits  Heart: S1S2 with RRR with + 2/6  murmur  Lungs: Clear throughout auscultation bilaterally without adventitious sounds  Abd: soft, nontender, nondistended, with good bowel sounds  Ext: warm, no edema, calves supple/nontender, pulses 2+ bilaterally  Skin: warm and dry  Psychiatric: Normal mood and affect  Neurologic: Alert and oriented X 3      ECG: paced    Data Review:   Recent Labs      06/28/17   1115  06/28/17   0820  06/28/17   0810  06/28/17   0758  06/28/17   0755   NA   --    --    --    --   143   K   --    --    --    --   5.5*   BUN   --    --    --    --   22   CREA   --    --    --    --   1.14*   GLU   --    --    --    --   95   WBC   --   7.7   --    --    --    HGB   --   12.8   --    --    --    HCT   --   40.7   --    --    --    PLT   --   214   --    --    --    INR   --    --   1.1   --    --    TNIPOC   --    --    --   0.13*   --    TROIQ  0.17*   --    --    --    --          CXR: Cardiac silhouette enlarged but no evidence of active failure seen    Assessment/Plan:   Principal Problem:    NSTEMI (non-ST elevated myocardial infarction) ([facility] (6/28/2017)- admit to tele, serial troponins, Echo, LHC when able to lie flat. Will add asa, statin, and change BB to toprol (approved for CHF, prior on lopressor 50mg daily). Continue heparin. Active Problems:    Cardiomyopathy [facility]) ()- has [employer]al[employer] ICD, notified device company for interrogation today. Will continue entresto, BB and change po lasix to IV lasix 40 mg bid with daily labs. Echo. COPD (chronic obstructive pulmonary disease) ([facility] [facility] ()- continue home meds for now. Atrial fibrillation ([facility] ()- stopped eliquis, on hep gtt and change BB as above. Systolic heart failure (HCC) ()- see above. Chest pain (6/28/2017)- see above. Ana Ham NP  6/28/2017  1:39 PM    Patient seen and examined by me. Agree with above note by physician extender. Key findings are:  sCHF and CP/dyspnea. CV- RRR without MRG  Lungs- Clear bilaterally  Abdomen- soft, non-tender, normal bowel sounds  Extremities- no edema    Plan:  IV diuresis and would consider LHC once stabilizes.         Keo Aponte MD

## 2017-06-28 NOTE — PROGRESS NOTES
Pt c/o continual CP, mid chest. Negative for diaphoresis and skin changes. SOB unchanged from previous. Pt at this time refusing to take any SL NTG stating taht it gave her a headache.

## 2017-06-28 NOTE — IP AVS SNAPSHOT
Summary of Care Report The Summary of Care report has been created to help improve care coordination. Users with access to [employer] or [address] (Web-based application) may access additional patient information including the Discharge Summary. If you are not currently a [address] user and need more information, please call the number listed below in the [place] [address] section and ask to be connected with Medical Records. Facility Information Name Address Phone [address] [address] [phone] Patient Information Patient Name Sex  Dyan Cuellar ([id number]) Female 1945 Discharge Information Admitting Provider Service Area Unit Adonis Gonzalez MD / Percy VALENTIN 935 3 Telemetry / [phone] Discharge Provider Discharge Date/Time Discharge Disposition Destination Adonis Gonzalez MD / [phone] 2017 Midday (Pending) AHR (none) Patient Language Language ENGLISH [13] Hospital Problems as of 2017  Reviewed: 2017 12:10 PM by Elsa Curiel NP Class Noted - Resolved Last Modified POA Active Problems Cardiomyopathy ([address] (Chronic)  Unknown - Present 2017 by Elsa Curiel NP Yes Entered by Evelyn Cisneros NP  
  COPD (chronic obstructive pulmonary disease) ([address] (Chronic)  Unknown - Present 2017 by Elsa Curiel NP Yes Entered by Evelyn Cisneros NP Atrial fibrillation ([address]  Unknown - Present 2017 by Evelyn Cisneros NP Yes Entered by Evelyn Cisneros NP Systolic heart failure (HCC) (Chronic)  Unknown - Present 2017 by Elsa Curiel NP Yes Entered by Evelyn Cisneros NP Chest pain  2017 - Present 2017 by Evelyn Cisneros NP Yes   Entered by Evelyn Cisneros NP  
 * (Principal)NSTEMI (non-ST elevated myocardial infarction) ([facility] (Chronic)  6/28/2017 - Present 6/30/2017 by Claudio Espinal NP Yes Entered by Ilia Gonzalez NP Chronic renal failure (Chronic)  6/30/2017 - Present 6/30/2017 by Claudio Espinal NP Yes Entered by Claudio Espinal NP Non-Hospital Problems as of 7/1/2017  Reviewed: 6/30/2017 12:10 PM by Claudio Espinal NP Class Noted - Resolved Last Modified Active Problems CHF (congestive heart failure) ([facility]  6/28/2017 - Present 6/28/2017 by Glenard Burkitt, MD  
  Entered by Glenard Burkitt, MD  
  Arthritis  Unknown - Present 6/28/2017 by Ilia Gonzalez NP Entered by Ilia Gonzalez NP You are allergic to the following Allergen Reactions Aspirin Nausea and Vomiting Current Discharge Medication List  
  
START taking these medications Dose & Instructions Dispensing Information Comments  
 metoprolol succinate 50 mg XL tablet Commonly known as:  TOPROL-XL Dose:  50 mg Take 1 Tab by mouth daily. Quantity:  30 Tab Refills:  11  
   
 predniSONE 10 mg tablet Commonly known as:  Slava Noss Dose:  10 mg Take 1 Tab by mouth daily (with breakfast). 4 tabs x 3 days then 3 tabs x 3 days then 2 tabs x 3 days then 1 tab x 3 days then stop med Quantity:  30 Tab Refills:  0 CONTINUE these medications which have CHANGED Dose & Instructions Dispensing Information Comments * albuterol 90 mcg/actuation inhaler Commonly known as:  VENTOLIN HFA What changed:  Another medication with the same name was changed. Make sure you understand how and when to take each. Notes to Patient:  AS NEEDED Dose:  1 Puff Take 1 Puff by inhalation every four (4) hours as needed. Quantity:  1 Inhaler Refills:  5  
   
 * albuterol sulfate 2.5 mg/0.5 mL Nebu nebulizer solution Commonly known as:  PROVENTIL;VENTOLIN What changed:   
- how much to take - when to take this Dose:  2.5 mg  
0.5 mL by Nebulization route three (3) times daily for 30 days. Quantity:  45 mL Refills:  1  
   
 tiotropium 18 mcg inhalation capsule Commonly known as:  Revision [employer] What changed:  Another medication with the same name was removed. Continue taking this medication, and follow the directions you see here. Dose:  1 Cap Take 1 Cap by inhalation daily. Quantity:  30 Cap Refills:  5  
   
 * Notice: This list has 2 medication(s) that are the same as other medications prescribed for you. Read the directions carefully, and ask your doctor or other care provider to review them with you. CONTINUE these medications which have NOT CHANGED Dose & Instructions Dispensing Information Comments  
 budesonide-formoterol 160-4.5 mcg/actuation HFA inhaler Commonly known as:  SYMBICORT Dose:  2 Puff Take 2 Puffs by inhalation two (2) times a day. Quantity:  1 Inhaler Refills:  5 ELIQUIS 5 mg tablet Generic drug:  apixaban Dose:  5 mg Take 5 mg by mouth two (2) times a day. Refills:  0 ENTRESTO 49-51 mg Tab tablet Generic drug:  sacubitril-valsartan Dose:  1 Tab Take 1 Tab by mouth two (2) times a day. Refills:  0 HYDROcodone-acetaminophen 5-325 mg per tablet Commonly known as:  Randi Machado Notes to Patient:  AS NEEDED Dose:  1 Tab Take 1 Tab by mouth every six (6) hours as needed for Pain. Refills:  0  
   
 LASIX 40 mg tablet Generic drug:  furosemide Dose:  40 mg Take 40 mg by mouth daily. Refills:  0  
   
 omeprazole 20 mg capsule Commonly known as:  PRILOSEC Dose:  20 mg Take 20 mg by mouth daily. Take one capsule every day before meals Refills:  0  
   
 oxyCODONE IR 5 mg immediate release tablet Commonly known as:  Jose Finn Notes to Patient:  AS NEEDED Dose:  5 mg Take 1 Tab by mouth every four (4) hours as needed for Pain for up to 15 doses. Max Daily Amount: 30 mg.  
 Quantity:  15 Tab Refills:  0  
   
 potassium chloride SA 10 mEq capsule Commonly known as:  Melody Poag Dose:  10 mEq Take 10 mEq by mouth two (2) times a day. Indications: take 2 tablest 2 times a day Refills:  0 STOP taking these medications Comments  
 megestrol 40 mg tablet Commonly known as:  MEGACE  
   
   
 metoprolol tartrate 50 mg tablet Commonly known as:  LOPRESSOR Follow-up Information Follow up With Details Comments Contact Info [facility] cardiology Call 1-2 weeks with her regular cardiologists Dr. Isacc Moe Pulmonologist  Call [facility] follow up, Please call office for appointment as soon as possible In [place] Discharge Instructions Cardiac Catheterization/Angiography Discharge Instructions *Check the puncture site frequently for swelling or bleeding. If you see any bleeding, lie down and apply pressure over the area with a clean town or washcloth. Notify your doctor for any redness, swelling, drainage or oozing from the puncture site. Notify your doctor for any fever or chills. *If the leg or arm with the puncture becomes cold, numb or painful, call [facility] at [phone] *Activity should be limited for the next 48 hours. Climb stairs as little as possible and avoid any stooping, bending or strenuous activity for 48 hours. No heavy lifting (anything over 10 pounds) for three days. *Do not drive for 48 hours. *You may resume your usual diet. Drink more fluids than usual. 
 
*Have a responsible person drive you home and stay with you for at least 24 hours after your heart catheterization/angiography. *You may remove the bandage from your Right Wrist in 24 hours. You may shower in 24 hours. No tub baths, hot tubs or swimming for one week.  Do not place any lotions, creams, powders, ointments over the puncture site for one week. You may place a clean band-aid over the puncture site each day for 5 days. Change this daily. Chronic Obstructive Pulmonary Disease (COPD): Care Instructions Your Care Instructions Chronic obstructive pulmonary disease (COPD) is a general term for a group of lung diseases, including emphysema and chronic bronchitis. People with COPD have decreased airflow in and out of the lungs, which makes it hard to breathe. The airways also can get clogged with thick mucus. Cigarette smoking is a major cause of COPD. Although there is no cure for COPD, you can slow its progress. Following your treatment plan and taking care of yourself can help you feel better and live longer. Follow-up care is a key part of your treatment and safety. Be sure to make and go to all appointments, and call your doctor if you are having problems. It's also a good idea to know your test results and keep a list of the medicines you take. How can you care for yourself at home? Staying healthy · Do not smoke. This is the most important step you can take to prevent more damage to your lungs. If you need help quitting, talk to your doctor about stop-smoking programs and medicines. These can increase your chances of quitting for good. · Avoid colds and flu. Get a pneumococcal vaccine shot. If you have had one before, ask your doctor whether you need a second dose. Get the flu vaccine every fall. If you must be around people with colds or the flu, wash your hands often. · Avoid secondhand smoke, air pollution, and high altitudes. Also avoid cold, dry air and hot, humid air. Stay at home with your windows closed when air pollution is bad. Medicines and oxygen therapy · Take your medicines exactly as prescribed. Call your doctor if you think you are having a problem with your medicine. · You may be taking medicines such as: ¨ Bronchodilators. These help open your airways and make breathing easier. Bronchodilators are either short-acting (work for 6 to 9 hours) or long-acting (work for 24 hours). You inhale most bronchodilators, so they start to act quickly. Always carry your quick-relief inhaler with you in case you need it while you are away from home. ¨ Corticosteroids (prednisone, budesonide). These reduce airway inflammation. They come in pill or inhaled form. You must take these medicines every day for them to work well. · A spacer may help you get more inhaled medicine to your lungs. Ask your doctor or pharmacist if a spacer is right for you. If it is, ask how to use it properly. · Do not take any vitamins, over-the-counter medicine, or herbal products without talking to your doctor first. 
· If your doctor prescribed antibiotics, take them as directed. Do not stop taking them just because you feel better. You need to take the full course of antibiotics. · Oxygen therapy boosts the amount of oxygen in your blood and helps you breathe easier. Use the flow rate your doctor has recommended, and do not change it without talking to your doctor first. 
Activity · Get regular exercise. Walking is an easy way to get exercise. Start out slowly, and walk a little more each day. · Pay attention to your breathing. You are exercising too hard if you cannot talk while you are exercising. · Take short rest breaks when doing household chores and other activities. · Learn breathing methodssuch as breathing through pursed lipsto help you become less short of breath. · If your doctor has not set you up with a pulmonary rehabilitation program, talk to him or her about whether rehab is right for you. Rehab includes exercise programs, education about your disease and how to manage it, help with diet and other changes, and emotional support. Diet · Eat regular, healthy meals. Use bronchodilators about 1 hour before you eat to make it easier to eat.  Eat several small meals instead of three large ones. Drink beverages at the end of the meal. Avoid foods that are hard to chew. · Eat foods that contain protein so that you do not lose muscle mass. · Talk with your doctor if you gain too much weight or if you lose weight without trying. Mental health · Talk to your family, friends, or a therapist about your feelings. It is normal to feel frightened, angry, hopeless, helpless, and even guilty. Talking openly about bad feelings can help you cope. If these feelings last, talk to your doctor. When should you call for help? Call 911 anytime you think you may need emergency care. For example, call if: 
· You have severe trouble breathing. Call your doctor now or seek immediate medical care if: 
· You have new or worse trouble breathing. · You cough up blood. · You have a fever. Watch closely for changes in your health, and be sure to contact your doctor if: 
· You cough more deeply or more often, especially if you notice more mucus or a change in the color of your mucus. · You have new or worse swelling in your legs or belly. · You are not getting better as expected. Where can you learn more? Go to http://billy-braulio.info/. Wyludanola Blinks in the search box to learn more about \"Chronic Obstructive Pulmonary Disease (COPD): Care Instructions. \" Current as of: March 25, 2017 Content Version: 11.3 © 1470-9176 [employer]. Care instructions adapted under license by [employer] (which disclaims liability or warranty for this information). If you have questions about a medical condition or this instruction, always ask your healthcare professional. [place] [address] any warranty or liability for your use of this information. Dilated Cardiomyopathy: Care Instructions Your Care Instructions Dilated cardiomyopathy is a condition that weakens your heart muscle and causes it to stretch, or dilate.  When your heart muscle is weak, it can't pump out blood as well as it should. More blood stays in your heart after each heartbeat. As more blood fills and stays in the heart, the heart muscle stretches even more and gets even weaker. Many things can cause dilated cardiomyopathy. It can be caused by another disease or condition, such as high blood pressure or a heart attack. Some people have a family history of dilated cardiomyopathy. For some people, the cause is not known. You may not have any symptoms at first. Or you may have mild symptoms, such as feeling very tired or weak. If your heart gets weaker, you may develop heart failure. Heart failure means that your heart muscle doesn't pump as much blood as your body needs. If this happens, you will feel other symptoms such as shortness of breath or trouble breathing when you lie down. The goal of treatment is to slow the disease and help you feel better. You may also have treatment for the cause of the cardiomyopathy. You will probably take a few medicines. If your doctor thinks it will help your heart and prevent problems, you may get a device such as a pacemaker. Self-care is another important part of your treatment. It includes the things you can do every day to feel better and stay as healthy as possible. Follow-up care is a key part of your treatment and safety. Be sure to make and go to all appointments, and call your doctor if you are having problems. It's also a good idea to know your test results and keep a list of the medicines you take. How can you care for yourself at home? Medicines · Be safe with medicines. Take your medicines exactly as prescribed. Call your doctor if you think you are having a problem with your medicine. You may be taking some of the following medicines: ¨ Angiotensin-converting enzyme (ACE) inhibitors or angiotensin II receptor blockers (ARBs). These make it easier for blood to flow. ¨ Diuretics. These help remove excess fluid from the body. ¨ Beta-blockers. These slow the heart rate and can help the heart fill with blood more completely. Heart-healthy lifestyle · Be active. Exercise regularly, but don't exercise too hard. If you aren't already active, your doctor may want you to start exercising. But don't start until you have talked with your doctor to make an exercise program that is safe for you. · Do not smoke. Smoking can make a heart condition worse. If you need help quitting, talk to your doctor about stop-smoking programs and medicines. These can increase your chances of quitting for good. · Eat a heart-healthy diet. · Stay at a healthy weight. Lose weight if you need to. · If your doctor recommends it, limit sodium. This helps keep fluid from building up in your body. It may help you feel better. Weight monitoring · Weigh yourself without clothing at the same time each day. Record your weight. Call your doctor if you have a sudden weight gain, such as more than 2 to 3 pounds in a day or 5 pounds in a week. (Your doctor may suggest a different range of weight gain.) A sudden weight gain may mean that your condition is getting worse. When should you call for help? Call 911 anytime you think you may need emergency care. For example, call if: 
· You have symptoms of sudden heart failure. These may include: ¨ Severe trouble breathing. ¨ A fast or irregular heartbeat. ¨ Coughing up pink, foamy mucus. ¨ Passing out. Call your doctor now or seek immediate medical care if: 
· You have new or changed symptoms of heart failure, such as: ¨ New or increased shortness of breath. ¨ New or worse swelling in your legs, ankles, or feet. ¨ Sudden weight gain, such as more than 2 to 3 pounds in a day or 5 pounds in a week. (Your doctor may suggest a different range of weight gain.) ¨ Feeling dizzy or lightheaded or like you may faint. ¨ Feeling so tired or weak that you cannot do your usual activities. ¨ Not sleeping well. Shortness of breath wakes you at night. You need extra pillows to prop yourself up to breathe easier. Watch closely for changes in your health, and be sure to contact your doctor if you have any problems. Where can you learn more? Go to http://billy-braulio.info/. Enter B164 in the search box to learn more about \"Dilated Cardiomyopathy: Care Instructions. \" Current as of: March 17, 2017 Content Version: 11.3 © 3728-8160 [employer]. Care instructions adapted under license by [employer] (which disclaims liability or warranty for this information). If you have questions about a medical condition or this instruction, always ask your healthcare professional. [address] any warranty or liability for your use of this information. Avoiding Triggers With Heart Failure: Care Instructions Your Care Instructions Triggers are anything that make your heart failure flare up. A flare-up is also called \"sudden heart failure\" or \"acute heart failure. \" When you have a flare-up, fluid builds up in your lungs, and you have problems breathing. You might need to go to the hospital. By watching for changes in your condition and avoiding triggers, you can prevent heart failure flare-ups. Follow-up care is a key part of your treatment and safety. Be sure to make and go to all appointments, and call your doctor if you are having problems. It's also a good idea to know your test results and keep a list of the medicines you take. How can you care for yourself at home? Watch for changes in your weight and condition · Weigh yourself without clothing at the same time each day. Record your weight. Call your doctor if you have sudden weight gain, such as more than 2 to 3 pounds in a day or 5 pounds in a week. (Your doctor may suggest a different range of weight gain.) A sudden weight gain may mean that your heart failure is getting worse. · Keep a daily record of your symptoms. Write down any changes in how you feel, such as new shortness of breath, cough, or problems eating. Also record if your ankles are more swollen than usual and if you feel more tired than usual. Note anything that you ate or did that could have triggered these changes. Limit sodium Sodium causes your body to hold on to extra water. This may cause your heart failure symptoms to get worse. People get most of their sodium from processed foods. Fast food and restaurant meals also tend to be very high in sodium. · Your doctor may suggest that you limit sodium to 2,000 milligrams (mg) a day or less. That is less than 1 teaspoon of salt a day, including all the salt you eat in cooking or in packaged foods. · Read food labels on cans and food packages. They tell you how much sodium you get in one serving. Check the serving size. If you eat more than one serving, you are getting more sodium. · Be aware that sodium can come in forms other than salt, including monosodium glutamate (MSG), sodium citrate, and sodium bicarbonate (baking soda). MSG is often added to Asian food. You can sometimes ask for food without MSG or salt. · Slowly reducing salt will help you adjust to the taste. Take the salt shaker off the table. · Flavor your food with garlic, lemon juice, onion, vinegar, herbs, and spices instead of salt. Do not use soy sauce, steak sauce, onion salt, garlic salt, mustard, or ketchup on your food, unless it is labeled \"low-sodium\" or \"low-salt. \" 
· Make your own salad dressings, sauces, and ketchup without adding salt. · Use fresh or frozen ingredients, instead of canned ones, whenever you can. Choose low-sodium canned goods. · Eat less processed food and food from restaurants, including fast food. Exercise as directed Moderate, regular exercise is very good for your heart. It improves your blood flow and helps control your weight.  But too much exercise can stress your heart and cause a heart failure flare-up. · Check with your doctor before you start an exercise program. 
· Walking is an easy way to get exercise. Start out slowly. Gradually increase the length and pace of your walk. Swimming, riding a bike, and using a treadmill are also good forms of exercise. · When you exercise, watch for signs that your heart is working too hard. You are pushing yourself too hard if you cannot talk while you are exercising. If you become short of breath or dizzy or have chest pain, stop, sit down, and rest. 
· Do not exercise when you do not feel well. Take medicines correctly · Take your medicines exactly as prescribed. Call your doctor if you think you are having a problem with your medicine. · Make a list of all the medicines you take. Include those prescribed to you by other doctors and any over-the-counter medicines, vitamins, or supplements you take. Take this list with you when you go to any doctor. · Take your medicines at the same time every day. It may help you to post a list of all the medicines you take every day and what time of day you take them. · Make taking your medicine as simple as you can. Plan times to take your medicines when you are doing other things, such as eating a meal or getting ready for bed. This will make it easier to remember to take your medicines. · Get organized. Use helpful tools, such as daily or weekly pill containers. When should you call for help? Call 911 if you have symptoms of sudden heart failure such as: 
· You have severe trouble breathing. · You cough up pink, foamy mucus. · You have a new irregular or rapid heartbeat. Call your doctor now or seek immediate medical care if: 
· You have new or increased shortness of breath. · You are dizzy or lightheaded, or you feel like you may faint.  
· You have sudden weight gain, such as more than 2 to 3 pounds in a day or 5 pounds in a week. (Your doctor may suggest a different range of weight gain.) · You have increased swelling in your legs, ankles, or feet. · You are suddenly so tired or weak that you cannot do your usual activities. Watch closely for changes in your health, and be sure to contact your doctor if you develop new symptoms. Where can you learn more? Go to http://billy-braulio.info/. Enter J719 in the search box to learn more about \"Avoiding Triggers With Heart Failure: Care Instructions. \" Current as of: February 23, 2017 Content Version: 11.3 © 9904-8352 [employer]. Care instructions adapted under license by [employer] (which disclaims liability or warranty for this information). If you have questions about a medical condition or this instruction, always ask your healthcare professional. [address] any warranty or liability for your use of this information. DISCHARGE SUMMARY from Nurse The following personal items are in your possession at time of discharge: 
 
Dental Appliances: None Visual Aid: Glasses, With patient Home Medications: None Jewelry: Bracelet, Other (comment), Earrings (Ankle braclet ) Clothing: Pants, Slippers, Socks, With patient Other Valuables: Cell Phone PATIENT INSTRUCTIONS: 
 
 
F-face looks uneven A-arms unable to move or move unevenly S-speech slurred or non-existent T-time-call 911 as soon as signs and symptoms begin-DO NOT go Back to bed or wait to see if you get better-TIME IS BRAIN. Warning Signs of HEART ATTACK Call 911 if you have these symptoms: 
? Chest discomfort. Most heart attacks involve discomfort in the center of the chest that lasts more than a few minutes, or that goes away and comes back. It can feel like uncomfortable pressure, squeezing, fullness, or pain. ? Discomfort in other areas of the upper body. Symptoms can include pain or discomfort in one or both arms, the back, neck, jaw, or stomach. ? Shortness of breath with or without chest discomfort. ? Other signs may include breaking out in a cold sweat, nausea, or lightheadedness. Don't wait more than five minutes to call [address]! Fast action can save your life. Calling 911 is almost always the fastest way to get lifesaving treatment. Emergency Medical Services staff can begin treatment when they arrive  up to an hour sooner than if someone gets to the hospital by car. The discharge information has been reviewed with the patient. The patient verbalized understanding. Discharge medications reviewed with the patient and appropriate educational materials and side effects teaching were provided. Chart Review Routing History No Routing History on File

## 2017-06-28 NOTE — ED NOTES
Patient admitted to [facility] room #320, transported via [facility] ambulance.

## 2017-06-28 NOTE — ED TRIAGE NOTES
Patient visiting from [place]. Patient c/o cough x 2 weeks. Patient presents to ER for cough, shortness of breath, fever and chills x 2 days. Patient with h/o COPD and uses O2 at 3L/min at all times.

## 2017-06-28 NOTE — PROGRESS NOTES
TRANSFER - IN REPORT:    Verbal report received from Yeny Francisco (name) on Carmela Gun  being received from  ED (unit) for routine progression of care      Report consisted of patients Situation, Background, Assessment and   Recommendations(SBAR). Information from the following report(s) SBAR, Kardex, MAR, Recent Results and Cardiac Rhythm paced was reviewed with the receiving nurse. Opportunity for questions and clarification was provided. Assessment completed upon patients arrival to unit and care assumed.

## 2017-06-28 NOTE — PROGRESS NOTES
Bedside and Verbal shift change report given to [facility] (oncoming nurse) by Ezra Tello (offgoing nurse). Report included the following information SBAR, Kardex, MAR, Recent Results and Cardiac Rhythm paced. PTT >110. Heparin stopped for 1 hour. See eMar. Verified visually at the bedside with [facility], RN. Entresto still not received from Pharmacy. Marilyn aware as well.

## 2017-06-29 LAB
ANION GAP BLD CALC-SCNC: 12 MMOL/L (ref 7–16)
APTT PPP: 81.2 SEC (ref 23.5–31.7)
APTT PPP: 94.9 SEC (ref 23.5–31.7)
ATRIAL RATE: 66 BPM
BUN SERPL-MCNC: 34 MG/DL (ref 8–23)
CALCIUM SERPL-MCNC: 8.2 MG/DL (ref 8.3–10.4)
CALCULATED P AXIS, ECG09: 68 DEGREES
CALCULATED R AXIS, ECG10: -112 DEGREES
CALCULATED T AXIS, ECG11: 38 DEGREES
CHLORIDE SERPL-SCNC: 102 MMOL/L (ref 98–107)
CHOLEST SERPL-MCNC: 144 MG/DL
CO2 SERPL-SCNC: 27 MMOL/L (ref 21–32)
CREAT SERPL-MCNC: 1.81 MG/DL (ref 0.6–1)
DIAGNOSIS, 93000: NORMAL
GLUCOSE SERPL-MCNC: 274 MG/DL (ref 65–100)
HDLC SERPL-MCNC: 69 MG/DL (ref 40–60)
HDLC SERPL: 2.1 {RATIO}
LDLC SERPL CALC-MCNC: 65 MG/DL
LIPID PROFILE,FLP: ABNORMAL
MAGNESIUM SERPL-MCNC: 1.8 MG/DL (ref 1.8–2.4)
POTASSIUM SERPL-SCNC: 3.6 MMOL/L (ref 3.5–5.1)
Q-T INTERVAL, ECG07: 500 MS
QRS DURATION, ECG06: 176 MS
QTC CALCULATION (BEZET), ECG08: 558 MS
SODIUM SERPL-SCNC: 141 MMOL/L (ref 136–145)
TRIGL SERPL-MCNC: 50 MG/DL (ref 35–150)
VENTRICULAR RATE, ECG03: 75 BPM
VLDLC SERPL CALC-MCNC: 10 MG/DL (ref 6–23)

## 2017-06-29 PROCEDURE — 83735 ASSAY OF MAGNESIUM: CPT | Performed by: NURSE PRACTITIONER

## 2017-06-29 PROCEDURE — B2151ZZ FLUOROSCOPY OF LEFT HEART USING LOW OSMOLAR CONTRAST: ICD-10-PCS | Performed by: INTERNAL MEDICINE

## 2017-06-29 PROCEDURE — 85730 THROMBOPLASTIN TIME PARTIAL: CPT | Performed by: INTERNAL MEDICINE

## 2017-06-29 PROCEDURE — 77030004534 HC CATH ANGI DX INFN CARD -A

## 2017-06-29 PROCEDURE — 74011250637 HC RX REV CODE- 250/637: Performed by: NURSE PRACTITIONER

## 2017-06-29 PROCEDURE — 65660000000 HC RM CCU STEPDOWN

## 2017-06-29 PROCEDURE — 77030029997 HC DEV COM RDL R BND TELE -B

## 2017-06-29 PROCEDURE — 80061 LIPID PANEL: CPT | Performed by: NURSE PRACTITIONER

## 2017-06-29 PROCEDURE — 36415 COLL VENOUS BLD VENIPUNCTURE: CPT | Performed by: NURSE PRACTITIONER

## 2017-06-29 PROCEDURE — 74011000250 HC RX REV CODE- 250: Performed by: NURSE PRACTITIONER

## 2017-06-29 PROCEDURE — 74011636320 HC RX REV CODE- 636/320: Performed by: INTERNAL MEDICINE

## 2017-06-29 PROCEDURE — 80048 BASIC METABOLIC PNL TOTAL CA: CPT | Performed by: NURSE PRACTITIONER

## 2017-06-29 PROCEDURE — 74011000250 HC RX REV CODE- 250: Performed by: INTERNAL MEDICINE

## 2017-06-29 PROCEDURE — 77010033678 HC OXYGEN DAILY

## 2017-06-29 PROCEDURE — 74011250636 HC RX REV CODE- 250/636

## 2017-06-29 PROCEDURE — C1769 GUIDE WIRE: HCPCS

## 2017-06-29 PROCEDURE — B2111ZZ FLUOROSCOPY OF MULTIPLE CORONARY ARTERIES USING LOW OSMOLAR CONTRAST: ICD-10-PCS | Performed by: INTERNAL MEDICINE

## 2017-06-29 PROCEDURE — 77030015766

## 2017-06-29 PROCEDURE — 94760 N-INVAS EAR/PLS OXIMETRY 1: CPT

## 2017-06-29 PROCEDURE — 94640 AIRWAY INHALATION TREATMENT: CPT

## 2017-06-29 PROCEDURE — 99152 MOD SED SAME PHYS/QHP 5/>YRS: CPT

## 2017-06-29 PROCEDURE — 74011250636 HC RX REV CODE- 250/636: Performed by: INTERNAL MEDICINE

## 2017-06-29 PROCEDURE — 74011250637 HC RX REV CODE- 250/637: Performed by: PHYSICIAN ASSISTANT

## 2017-06-29 PROCEDURE — 93458 L HRT ARTERY/VENTRICLE ANGIO: CPT

## 2017-06-29 PROCEDURE — C1894 INTRO/SHEATH, NON-LASER: HCPCS

## 2017-06-29 PROCEDURE — 4A023N7 MEASUREMENT OF CARDIAC SAMPLING AND PRESSURE, LEFT HEART, PERCUTANEOUS APPROACH: ICD-10-PCS | Performed by: INTERNAL MEDICINE

## 2017-06-29 RX ORDER — FUROSEMIDE 40 MG/1
40 TABLET ORAL DAILY
Status: DISCONTINUED | OUTPATIENT
Start: 2017-06-29 | End: 2017-07-01 | Stop reason: HOSPADM

## 2017-06-29 RX ORDER — SODIUM CHLORIDE 0.9 % (FLUSH) 0.9 %
5-10 SYRINGE (ML) INJECTION EVERY 8 HOURS
Status: DISCONTINUED | OUTPATIENT
Start: 2017-06-29 | End: 2017-07-01 | Stop reason: HOSPADM

## 2017-06-29 RX ORDER — LIDOCAINE HYDROCHLORIDE 20 MG/ML
1-20 INJECTION, SOLUTION INFILTRATION; PERINEURAL ONCE
Status: COMPLETED | OUTPATIENT
Start: 2017-06-29 | End: 2017-06-29

## 2017-06-29 RX ORDER — HEPARIN SODIUM 200 [USP'U]/100ML
25 INJECTION, SOLUTION INTRAVENOUS CONTINUOUS
Status: DISCONTINUED | OUTPATIENT
Start: 2017-06-29 | End: 2017-06-29

## 2017-06-29 RX ORDER — FENTANYL CITRATE 50 UG/ML
25-100 INJECTION, SOLUTION INTRAMUSCULAR; INTRAVENOUS
Status: DISCONTINUED | OUTPATIENT
Start: 2017-06-29 | End: 2017-06-29

## 2017-06-29 RX ORDER — SODIUM CHLORIDE 9 MG/ML
75 INJECTION, SOLUTION INTRAVENOUS CONTINUOUS
Status: DISCONTINUED | OUTPATIENT
Start: 2017-06-29 | End: 2017-06-30

## 2017-06-29 RX ORDER — MIDAZOLAM HYDROCHLORIDE 1 MG/ML
1-6 INJECTION, SOLUTION INTRAMUSCULAR; INTRAVENOUS
Status: DISCONTINUED | OUTPATIENT
Start: 2017-06-29 | End: 2017-06-29

## 2017-06-29 RX ORDER — SODIUM CHLORIDE 0.9 % (FLUSH) 0.9 %
5-10 SYRINGE (ML) INJECTION AS NEEDED
Status: DISCONTINUED | OUTPATIENT
Start: 2017-06-29 | End: 2017-07-01 | Stop reason: HOSPADM

## 2017-06-29 RX ADMIN — HEPARIN SODIUM 2 ML: 10000 INJECTION, SOLUTION INTRAVENOUS; SUBCUTANEOUS at 09:49

## 2017-06-29 RX ADMIN — BUDESONIDE: 0.5 SUSPENSION RESPIRATORY (INHALATION) at 21:07

## 2017-06-29 RX ADMIN — MIDAZOLAM HYDROCHLORIDE 2 MG: 1 INJECTION, SOLUTION INTRAMUSCULAR; INTRAVENOUS at 09:46

## 2017-06-29 RX ADMIN — ALBUTEROL SULFATE 2.5 MG: 2.5 SOLUTION RESPIRATORY (INHALATION) at 13:55

## 2017-06-29 RX ADMIN — PANTOPRAZOLE SODIUM 40 MG: 40 TABLET, DELAYED RELEASE ORAL at 08:59

## 2017-06-29 RX ADMIN — Medication 5 ML: at 11:14

## 2017-06-29 RX ADMIN — Medication 5 ML: at 21:47

## 2017-06-29 RX ADMIN — METOPROLOL SUCCINATE 50 MG: 50 TABLET, EXTENDED RELEASE ORAL at 08:59

## 2017-06-29 RX ADMIN — ALBUTEROL SULFATE 2.5 MG: 2.5 SOLUTION RESPIRATORY (INHALATION) at 21:07

## 2017-06-29 RX ADMIN — HEPARIN SODIUM 25 ML/HR: 200 INJECTION, SOLUTION INTRAVENOUS at 09:40

## 2017-06-29 RX ADMIN — ATORVASTATIN CALCIUM 40 MG: 40 TABLET, FILM COATED ORAL at 21:44

## 2017-06-29 RX ADMIN — BUDESONIDE 500 MCG: 0.5 SUSPENSION RESPIRATORY (INHALATION) at 08:57

## 2017-06-29 RX ADMIN — ASPIRIN 81 MG 81 MG: 81 TABLET ORAL at 08:59

## 2017-06-29 RX ADMIN — FUROSEMIDE 40 MG: 40 TABLET ORAL at 11:11

## 2017-06-29 RX ADMIN — ALBUTEROL SULFATE 2.5 MG: 2.5 SOLUTION RESPIRATORY (INHALATION) at 08:57

## 2017-06-29 RX ADMIN — FENTANYL CITRATE 50 MCG: 50 INJECTION, SOLUTION INTRAMUSCULAR; INTRAVENOUS at 09:47

## 2017-06-29 RX ADMIN — ALBUTEROL SULFATE 2.5 MG: 2.5 SOLUTION RESPIRATORY (INHALATION) at 02:19

## 2017-06-29 RX ADMIN — SACUBITRIL AND VALSARTAN 1 TABLET: 49; 51 TABLET, FILM COATED ORAL at 21:47

## 2017-06-29 RX ADMIN — TIOTROPIUM BROMIDE 18 MCG: 18 CAPSULE ORAL; RESPIRATORY (INHALATION) at 08:57

## 2017-06-29 RX ADMIN — NITROGLYCERIN 1 INCH: 20 OINTMENT TOPICAL at 00:00

## 2017-06-29 RX ADMIN — LIDOCAINE HYDROCHLORIDE 60 MG: 20 INJECTION, SOLUTION INFILTRATION; PERINEURAL at 09:47

## 2017-06-29 RX ADMIN — SACUBITRIL AND VALSARTAN 1 TABLET: 49; 51 TABLET, FILM COATED ORAL at 11:11

## 2017-06-29 RX ADMIN — SODIUM CHLORIDE 75 ML/HR: 900 INJECTION, SOLUTION INTRAVENOUS at 11:12

## 2017-06-29 RX ADMIN — IOPAMIDOL 40 ML: 755 INJECTION, SOLUTION INTRAVENOUS at 09:57

## 2017-06-29 NOTE — PROCEDURES
Cardiac Catheterization Procedure Note    Patient ID:     Name: Patience Lara   Medical Record Number: [id number]   YOB: 1945    Date of Procedure: 6/29/2017    Physician: Fareed Phillips MD    Referring Dr Mimi Leonardo    Indications: This is a 73 yrs female who presents with chf trop. Blood loss less than 5 ml    Sedation. Pt received 2 mg versed and 50 mcg fentanyl for monitored conscious sedation in 1 15 minute intervals. Specimen: None    No complications    No assistants    Time out, Mallampati, and ASA performed    Procedure:  After informed consent, patient was prepped and draped in the usual sterile fashion. The right wrist was infiltrated with lidocaine. The right radial artery was accessed via the modified Seldinger technique with a 6 Swiss sheath. 60cc Visipaque contrast were utilized for the entire procedure. no closure device used    Catheters.       FINDINGS    Left Ventricle: 35  LVEDP: 20    Left Main:normal    Left Anterior descending coronary artery: normal    Left Circumflex coronary artery: normal    Right coronary artery: normal      Graft anatomy: NA    Intervention if done: NA    Conclusions: normal coronaries    Recommentations: med tx    No complications      Signed By: Fareed Phillips MD

## 2017-06-29 NOTE — PROGRESS NOTES
6/29/2017 7:33 AM    Admit Date: 6/28/2017    Admit Diagnosis: CHF;NSTEMI (non-ST elevated myocardial infarction) ([facility] [address]      Subjective:    Patient feeling better. Lying flat.  prob ready for cath    Objective:      Visit Vitals    /66    Pulse 80    Temp 98.8 °F (37.1 °C)    Resp 19    Ht 5' 3\" (1.6 m)    Wt 70.7 kg (155 lb 14.4 oz)    SpO2 100%    BMI 27.62 kg/m2       ROS:  General ROS: negative for - chills  Hematological and Lymphatic ROS: negative for - blood clots or jaundice  Respiratory ROS: no cough, shortness of breath, or wheezing  Cardiovascular ROS: no chest pain or dyspnea on exertion  Gastrointestinal ROS: no abdominal pain, change in bowel habits, or black or bloody stools  Neurological ROS: no TIA or stroke symptoms    Physical Exam:    Physical Examination: General appearance - alert, well appearing, and in no distress  Mental status - alert, oriented to person, place, and time  Eyes - pupils equal and reactive, extraocular eye movements intact  Neck/lymph - supple, no significant adenopathy  Chest/CV - clear to auscultation, no wheezes, rales or rhonchi, symmetric air entry  Heart - normal rate, regular rhythm, normal S1, S2, no murmurs, rubs, clicks or gallops  Abdomen/GI - soft, nontender, nondistended, no masses or organomegaly  Musculoskeletal - no joint tenderness, deformity or swelling  Extremities - peripheral pulses normal, no pedal edema, no clubbing or cyanosis  Skin - normal coloration and turgor, no rashes, no suspicious skin lesions noted    Current Facility-Administered Medications   Medication Dose Route Frequency    heparin 25,000 units in dextrose 500 mL infusion  12-25 Units/kg/hr IntraVENous TITRATE    oxyCODONE IR (ROXICODONE) tablet 5 mg  5 mg Oral Q4H PRN    sacubitril-valsartan (ENTRESTO) 49-51 mg tablet 1 Tab  1 Tab Oral BID    tiotropium (SPIRIVA) inhalation capsule 18 mcg  1 Cap Inhalation DAILY    albuterol (PROVENTIL HFA, VENTOLIN HFA, PROAIR HFA) inhaler 1 Puff  1 Puff Inhalation Q4H PRN    budesonide (PULMICORT) 500 mcg/2 ml nebulizer suspension   Nebulization BID RT    pantoprazole (PROTONIX) tablet 40 mg  40 mg Oral ACB    sodium chloride (NS) flush 5-10 mL  5-10 mL IntraVENous Q8H    sodium chloride (NS) flush 5-10 mL  5-10 mL IntraVENous PRN    aspirin chewable tablet 81 mg  81 mg Oral DAILY    nitroglycerin (NITROBID) 2 % ointment 1 Inch  1 Inch Topical Q6H    morphine injection 2 mg  2 mg IntraVENous Q4H PRN    furosemide (LASIX) injection 40 mg  40 mg IntraVENous BID    metoprolol succinate (TOPROL-XL) XL tablet 50 mg  50 mg Oral DAILY    atorvastatin (LIPITOR) tablet 40 mg  40 mg Oral QHS    albuterol (PROVENTIL VENTOLIN) nebulizer solution 2.5 mg  2.5 mg Nebulization Q6H RT       Data Review:   @LABRCNT(Na,K,BUN,CREA,WBC,HGB,HCT,PLT,INR,TRP,TCHOL*,Triglyceride*,LDL*,LDLCPOC HDL*,HDL])@    TELEMETRY: AF    Assessment/Plan:     Principal Problem:    NSTEMI (non-ST elevated myocardial infarction) ([facility] [address] (6/28/2017) consider cath today    Active Problems:    Cardiomyopathy [facility]) () The current medical regimen is effective;  continue present plan and medications. COPD (chronic obstructive pulmonary disease) (HCC) ()      Atrial fibrillation (HCC) ()The current medical regimen is effective;  continue present plan and medications. Systolic heart failure (HCC) ()The current medical regimen is effective;  continue present plan and medications.         Chest pain (6/28/2017)          Sánchez Armstrong MD

## 2017-06-29 NOTE — ROUTINE PROCESS
TRANSFER - OUT REPORT:    Verbal report given to Asif Pope RN (name) on Frannie Solano  being transferred to CPRU (unit) for routine progression of care       Report consisted of patients Situation, Background, Assessment and   Recommendations(SBAR). Information from the following report(s) Procedure Summary, MAR and Recent Results was reviewed with the receiving nurse. Lines:   Peripheral IV 06/28/17 Right Forearm (Active)   Site Assessment Ecchymotic (bruised) 6/28/2017  8:01 PM   Phlebitis Assessment 0 6/28/2017  8:01 PM   Infiltration Assessment 0 6/28/2017  8:01 PM   Dressing Status Clean, dry, & intact 6/28/2017  8:01 PM   Dressing Type Transparent 6/28/2017  8:01 PM   Hub Color/Line Status Infusing;Flushed;Patent 6/28/2017  8:01 PM   Alcohol Cap Used No 6/28/2017  4:10 PM       Peripheral IV 06/28/17 Right Antecubital (Active)   Site Assessment Clean, dry, & intact 6/28/2017  8:01 PM   Phlebitis Assessment 0 6/28/2017  8:01 PM   Infiltration Assessment 0 6/28/2017  8:01 PM   Dressing Status Clean, dry, & intact 6/28/2017  8:01 PM   Dressing Type Transparent 6/28/2017  8:01 PM   Hub Color/Line Status Patent; Flushed;Capped 6/28/2017  8:01 PM   Alcohol Cap Used No 6/28/2017  4:10 PM        Opportunity for questions and clarification was provided. Patient transported with:   [facility] w/ Dr Chidi Adams cath  R band to right radial w/ 10 mls at 1000  Versed 2 mg IV  Fentanyl 50 mcg IV      Report also called to Jarrell Gurrola RN on telemetry.

## 2017-06-29 NOTE — PROGRESS NOTES
Report received from Deann España. Procedural findings communicated. Intra procedural  medication administration reviewed. Progression of care discussed.      Patient received into [id number] [facility] 7 post sheath removal.     Access site without bleeding or swelling yes    Dressing dry and intact yes    Patient instructed to limit movement to right upper extremity    Routine post procedural vital signs and site assessment initiated yes

## 2017-06-29 NOTE — PROGRESS NOTES
Radial band removed per protocol. Right radial puncture c/d/i with gauze/tegaderm.  Restrictions reviewed

## 2017-06-29 NOTE — PROGRESS NOTES
Bedside report received from [facility]. Pt resting in bed, denies complaints. Heparin gtt currently stopped per protocol. Will monitor.

## 2017-06-29 NOTE — PROGRESS NOTES
Applied R-band to right wrist with 10 mL of air in band. Site without bleeding or hematoma. Capillary refill distal to the site was less than 2 seconds. Patient instructed to limit movement of affected wrist. Patient verbalized understanding.

## 2017-06-29 NOTE — PROGRESS NOTES
Bedside and Verbal shift change report given to [facility]. Report included the following information SBAR, Kardex, MAR, Accordion and Recent Results. Heparin gtt verified.

## 2017-06-29 NOTE — CDMP QUERY
Please clarify if this patient is being treated/managed for:     **Acute systolic CHF in the setting of history of systolic HF and cardiomyopathy with SOB, CP, cough, BNP-2055, ECHO with moderate LV dysfunction with EF 30-35% being treated with IV Lasix, LHC.     =>Other Explanation of clinical findings  =>Unable to Determine (no explanation of clinical findings)     The medical record reflects the following:     Risk Factors: history of systolic HF and cardiomyopathy     Clinical Indicators: SOB, CP, cough, BNP-2055, ECHO with moderate LV dysfunction with EF 30-35%     Treatment: IV Lasix, LHC     Please clarify and document your clinical opinion in the progress notes and discharge summary including the definitive and/or presumptive diagnosis, (suspected or probable), related to the above clinical findings.  Please include clinical findings supporting your diagnosis.     Thanks,  Jolene Morrow RN, CDS  Compliant Documentation Management Program  [phone]

## 2017-06-29 NOTE — PROGRESS NOTES
TRANSFER - OUT REPORT:    Verbal report given to Renuka Gutiérrez RN on Frannie Solano  being transferred to [address] [place] [address] for ordered procedure       Report consisted of patients Situation, Background, Assessment and Recommendations(SBAR). Information from the following report(s) SBAR, Kardex and Recent Results was reviewed with the receiving nurse. Opportunity for questions and clarification was provided.

## 2017-06-29 NOTE — PROGRESS NOTES
Bedside and Verbal shift change report given to self (oncoming nurse) by Byron Schulte RN (offgoing nurse). Report included the following information SBAR, Kardex, ED Summary, Procedure Summary, MAR, Recent Results and Cardiac Rhythm SR and paced on demand.

## 2017-06-29 NOTE — PROCEDURES
Arlyn [address]       Name:  Atul Vela   MR#:  [id number]   :  1945   Account #:  [de-identified]   Date of Adm:  2017       This is a left heart catheterization, selective coronary   angiography, left ventriculogram. Patient presented with   abnormal troponins and congestive heart failure. COMPLICATIONS: None. ACCESS: Right radial. TIG4 and pigtail were used. FINDINGS    Left ventriculogram done in LIZ projection shows EF of   approximately 35%, LVEDP of 20 with global hypokinesis, no   gradient on pullback. Left main arises normally, bifurcates in a LAD and circumflex. The   left main is normal.     LAD courses to the apex, supplies 3 diagonals. The LAD and   diagonals are angiographically normal and moderate sized. Circumflex artery is a large dominant artery supplying 3 OMs and a   PDA. The circumflex system is angiographically normal.     Right coronary artery is a small nondominant vessel with minimal   circulation. The right is normal.     CONCLUSIONS: Normal coronary arteriography in a circumflex   dominant system with low ejection fraction of 35%. Patient has   an indwelling BiV implantable cardioverter-defibrillator.          MD Edison Aceves / Jesus Alberto Aguilar   D:  2017   10:04   T:  2017   16:24   Job #:  [id number]

## 2017-06-30 PROBLEM — I21.4 NSTEMI (NON-ST ELEVATED MYOCARDIAL INFARCTION) (HCC): Chronic | Status: ACTIVE | Noted: 2017-06-28

## 2017-06-30 PROBLEM — N18.9 CHRONIC RENAL FAILURE: Chronic | Status: ACTIVE | Noted: 2017-06-30

## 2017-06-30 LAB
ANION GAP BLD CALC-SCNC: 8 MMOL/L (ref 7–16)
BUN SERPL-MCNC: 30 MG/DL (ref 8–23)
CALCIUM SERPL-MCNC: 7.9 MG/DL (ref 8.3–10.4)
CHLORIDE SERPL-SCNC: 111 MMOL/L (ref 98–107)
CO2 SERPL-SCNC: 28 MMOL/L (ref 21–32)
CREAT SERPL-MCNC: 1.26 MG/DL (ref 0.6–1)
GLUCOSE SERPL-MCNC: 131 MG/DL (ref 65–100)
MAGNESIUM SERPL-MCNC: 1.8 MG/DL (ref 1.8–2.4)
POTASSIUM SERPL-SCNC: 3.6 MMOL/L (ref 3.5–5.1)
SODIUM SERPL-SCNC: 147 MMOL/L (ref 136–145)

## 2017-06-30 PROCEDURE — 94760 N-INVAS EAR/PLS OXIMETRY 1: CPT

## 2017-06-30 PROCEDURE — 80048 BASIC METABOLIC PNL TOTAL CA: CPT | Performed by: NURSE PRACTITIONER

## 2017-06-30 PROCEDURE — 83735 ASSAY OF MAGNESIUM: CPT | Performed by: NURSE PRACTITIONER

## 2017-06-30 PROCEDURE — 74011000250 HC RX REV CODE- 250: Performed by: NURSE PRACTITIONER

## 2017-06-30 PROCEDURE — 65660000000 HC RM CCU STEPDOWN

## 2017-06-30 PROCEDURE — 74011250637 HC RX REV CODE- 250/637: Performed by: NURSE PRACTITIONER

## 2017-06-30 PROCEDURE — 36415 COLL VENOUS BLD VENIPUNCTURE: CPT | Performed by: NURSE PRACTITIONER

## 2017-06-30 PROCEDURE — 74011250637 HC RX REV CODE- 250/637: Performed by: PHYSICIAN ASSISTANT

## 2017-06-30 PROCEDURE — 74011000250 HC RX REV CODE- 250: Performed by: INTERNAL MEDICINE

## 2017-06-30 PROCEDURE — 99222 1ST HOSP IP/OBS MODERATE 55: CPT | Performed by: INTERNAL MEDICINE

## 2017-06-30 PROCEDURE — 77030012341 HC CHMB SPCR OPTC MDI VYRM -A

## 2017-06-30 PROCEDURE — 74011250636 HC RX REV CODE- 250/636: Performed by: INTERNAL MEDICINE

## 2017-06-30 PROCEDURE — 77010033678 HC OXYGEN DAILY

## 2017-06-30 PROCEDURE — 94640 AIRWAY INHALATION TREATMENT: CPT

## 2017-06-30 RX ORDER — METOPROLOL SUCCINATE 50 MG/1
50 TABLET, EXTENDED RELEASE ORAL DAILY
Qty: 30 TAB | Refills: 11 | Status: SHIPPED | OUTPATIENT
Start: 2017-06-30 | End: 2017-07-01

## 2017-06-30 RX ORDER — ALBUTEROL SULFATE 0.83 MG/ML
2.5 SOLUTION RESPIRATORY (INHALATION)
Status: DISCONTINUED | OUTPATIENT
Start: 2017-06-30 | End: 2017-07-01 | Stop reason: HOSPADM

## 2017-06-30 RX ADMIN — ALBUTEROL SULFATE 2.5 MG: 2.5 SOLUTION RESPIRATORY (INHALATION) at 20:26

## 2017-06-30 RX ADMIN — Medication 5 ML: at 05:33

## 2017-06-30 RX ADMIN — APIXABAN 5 MG: 5 TABLET, FILM COATED ORAL at 15:34

## 2017-06-30 RX ADMIN — Medication 10 ML: at 15:39

## 2017-06-30 RX ADMIN — ASPIRIN 81 MG 81 MG: 81 TABLET ORAL at 09:09

## 2017-06-30 RX ADMIN — FUROSEMIDE 40 MG: 40 TABLET ORAL at 09:09

## 2017-06-30 RX ADMIN — METOPROLOL SUCCINATE 50 MG: 50 TABLET, EXTENDED RELEASE ORAL at 09:09

## 2017-06-30 RX ADMIN — ALBUTEROL SULFATE 2.5 MG: 2.5 SOLUTION RESPIRATORY (INHALATION) at 14:27

## 2017-06-30 RX ADMIN — BUDESONIDE 500 MCG: 0.5 SUSPENSION RESPIRATORY (INHALATION) at 20:26

## 2017-06-30 RX ADMIN — ALBUTEROL SULFATE 2.5 MG: 2.5 SOLUTION RESPIRATORY (INHALATION) at 02:27

## 2017-06-30 RX ADMIN — METHYLPREDNISOLONE SODIUM SUCCINATE 40 MG: 40 INJECTION, POWDER, FOR SOLUTION INTRAMUSCULAR; INTRAVENOUS at 15:34

## 2017-06-30 RX ADMIN — PANTOPRAZOLE SODIUM 40 MG: 40 TABLET, DELAYED RELEASE ORAL at 05:33

## 2017-06-30 RX ADMIN — SACUBITRIL AND VALSARTAN 1 TABLET: 49; 51 TABLET, FILM COATED ORAL at 09:09

## 2017-06-30 RX ADMIN — ALBUTEROL SULFATE 2.5 MG: 2.5 SOLUTION RESPIRATORY (INHALATION) at 08:33

## 2017-06-30 RX ADMIN — SACUBITRIL AND VALSARTAN 1 TABLET: 49; 51 TABLET, FILM COATED ORAL at 21:19

## 2017-06-30 RX ADMIN — TIOTROPIUM BROMIDE 18 MCG: 18 CAPSULE ORAL; RESPIRATORY (INHALATION) at 08:37

## 2017-06-30 RX ADMIN — Medication 5 ML: at 21:21

## 2017-06-30 NOTE — PROGRESS NOTES
Ambulating in [place] with cane and 3L 02 (home 02 dose)  02 sat on home dose 02 is 98% after ambulating from room 320 to end of ballesteros and back  Pt reports some dyspnea with exertion- question if this is her baseline?  Rance Cooks, NP updated

## 2017-06-30 NOTE — PROGRESS NOTES
Bedside and Verbal shift change report given to Vangie Alejo RN (oncoming nurse) by self Aisha Argue nurse). Report included the following information SBAR, Kardex, ED Summary, Procedure Summary, MAR and Recent Results.

## 2017-06-30 NOTE — CONSULTS
PULMONARY/CCM CONSULT :  6/30/2017    Date of Admission:  6/28/2017    The patient's chart has been reviewed and the chart has been discussed with nursing staff. Subjective: This patient has been seen and evaluated at the request of Dr. Kashif Chen. Patient is a 73 y.o. female presents with SOB. She presented with acute heart failure in the setting of ICD with EF 35%. Her BNP was 2055. She was diuresed and underwent LHC with normal coronaries. She has underlying COPD. She lives in [place] and is here visiting family. At home, she is on symbicort, albuterol, and spiriva at home. She has Stage IV COPD on continuous oxygen at home. CXR without acute abnormality. We were asked to see her for AECOPD. Her family reports that she is not back to baseline; she is wheezing and has SORIANO. She denies hemoptysis, chest pain, chills, fever or productive cough. Past Medical History:   Diagnosis Date    Arthritis     [facility] [address]     Cardiomyopathy ([facility] [address]     Chronic obstructive pulmonary disease (HCC)     COPD (chronic obstructive pulmonary disease) ([facility] [address]     Hypertension     Ill-defined condition     Anemia     Systolic heart failure (HCC)       Past Surgical History:   Procedure Laterality Date    HX IMPLANTABLE CARDIOVERTER DEFIBRILLATOR      HX ORTHOPAEDIC      right foot    HX ORTHOPAEDIC      right hip replacement     HX SHOULDER ARTHROSCOPY        Social History   Substance Use Topics    Smoking status: Former Smoker    Smokeless tobacco: Never Used    Alcohol use No      No family history on file. Allergies   Allergen Reactions    Aspirin Nausea and Vomiting      Prior to Admission Medications   Prescriptions Last Dose Informant Patient Reported? Taking? HYDROcodone-acetaminophen (NORCO) 5-325 mg per tablet Unknown at Unknown time  Yes No   Sig: Take 1 Tab by mouth every six (6) hours as needed for Pain.    albuterol (VENTOLIN HFA) 90 mcg/actuation inhaler Unknown at Unknown time  Yes No   Sig: Take 1 Puff by inhalation every four (4) hours as needed. albuterol sulfate (PROVENTIL;VENTOLIN) 2.5 mg/0.5 mL nebu nebulizer solution Unknown at Unknown time  Yes No   Sig: by Nebulization route once. apixaban (ELIQUIS) 5 mg tablet Unknown at Unknown time  Yes No   Sig: Take 5 mg by mouth two (2) times a day. budesonide-formoterol (SYMBICORT) 160-4.5 mcg/actuation HFA inhaler Unknown at Unknown time  Yes No   Sig: Take 2 Puffs by inhalation two (2) times a day. furosemide (LASIX) 40 mg tablet Unknown at Unknown time  Yes No   Sig: Take 40 mg by mouth daily. megestrol (MEGACE) 40 mg tablet Unknown at Unknown time  Yes No   Sig: Take 40 mg by mouth four (4) times daily. metoprolol tartrate (LOPRESSOR) 50 mg tablet Unknown at Unknown time  Yes No   Sig: Take 50 mg by mouth once. omeprazole (PRILOSEC) 20 mg capsule Unknown at Unknown time  Yes No   Sig: Take 20 mg by mouth daily. Take one capsule every day before meals   oxyCODONE IR (ROXICODONE) 5 mg immediate release tablet Unknown at Unknown time  No No   Sig: Take 1 Tab by mouth every four (4) hours as needed for Pain for up to 15 doses. Max Daily Amount: 30 mg.   potassium chloride SA (MICRO-K) 10 mEq capsule Unknown at Unknown time  Yes No   Sig: Take 10 mEq by mouth two (2) times a day. Indications: take 2 tablest 2 times a day   sacubitril-valsartan (ENTRESTO) 49 mg/51 mg tablet Unknown at Unknown time  Yes No   Sig: Take 1 Tab by mouth two (2) times a day. tiotropium (SPIRIVA WITH HANDIHALER) 18 mcg inhalation capsule Unknown at Unknown time  Yes No   Sig: Take 1 Cap by inhalation daily.       Facility-Administered Medications: None       MEDS SCHEDULED:    Current Facility-Administered Medications   Medication Dose Route Frequency    sodium chloride (NS) flush 5-10 mL  5-10 mL IntraVENous Q8H    sodium chloride (NS) flush 5-10 mL  5-10 mL IntraVENous PRN    furosemide (LASIX) tablet 40 mg  40 mg Oral DAILY    oxyCODONE IR (ROXICODONE) tablet 5 mg  5 mg Oral Q4H PRN    sacubitril-valsartan (ENTRESTO) 49-51 mg tablet 1 Tab  1 Tab Oral BID    tiotropium (SPIRIVA) inhalation capsule 18 mcg  1 Cap Inhalation DAILY    albuterol (PROVENTIL HFA, VENTOLIN HFA, PROAIR HFA) inhaler 1 Puff  1 Puff Inhalation Q4H PRN    budesonide (PULMICORT) 500 mcg/2 ml nebulizer suspension   Nebulization BID RT    pantoprazole (PROTONIX) tablet 40 mg  40 mg Oral ACB    sodium chloride (NS) flush 5-10 mL  5-10 mL IntraVENous Q8H    sodium chloride (NS) flush 5-10 mL  5-10 mL IntraVENous PRN    aspirin chewable tablet 81 mg  81 mg Oral DAILY    morphine injection 2 mg  2 mg IntraVENous Q4H PRN    metoprolol succinate (TOPROL-XL) XL tablet 50 mg  50 mg Oral DAILY    atorvastatin (LIPITOR) tablet 40 mg  40 mg Oral QHS    albuterol (PROVENTIL VENTOLIN) nebulizer solution 2.5 mg  2.5 mg Nebulization Q6H RT         Review of Systems  Constitutional: positive for fatigue  Respiratory: positive for cough, wheezing or dyspnea on exertion  Cardiovascular: negative for chest pain, chest pressure/discomfort  Musculoskeletal:positive for SORIANO    Objective:     Vitals:    06/30/17 0229 06/30/17 0506 06/30/17 0833 06/30/17 0937   BP:  105/68  129/66   Pulse:  78  78   Resp:  19  18   Temp:  97 °F (36.1 °C)  98.4 °F (36.9 °C)   SpO2: 95% 98% 96% 99%   Weight:  158 lb 3.2 oz (71.8 kg)     Height:         06/30 0701 - 06/30 1900  In: -   Out: 1000 [Urine:1000]  06/28 1901 - 06/30 0700  In: 890 [P.O.:890]  Out: 1500 [Urine:1500]      PHYSICAL EXAM     Physical Exam:   General:  Alert, cooperative, no acute distress, appears stated age. Eyes:  Conjunctivae/corneas clear. Nose: Nares patent and moist. Septum midline. Mouth/Throat: Lips, mucosa, and tongue pink and intact.     Neck: Supple, symmetrical.   Respiratory:   Few scattered,wheezes to auscultation bilaterally on NC   Cardiovascular:  Irregular rate and rhythm, S1, S2, no murmur, click, rub or gallop. GI:   Abdomen soft, non-tender. Bowel sounds active X 4 Q. Musculoskeletal: Extremities symmetrical, atraumatic, no cyanosis, no edema. Pulses: 2+ and symmetric all extremities. Skin: Skin color, texture, turgor normal.        Neurologic: 2+ strength bilateral upper and lower extremities, sensation throughout appropriate. Alert and oriented.        Activity: ambulates  Nutrition:cardiac    CHEST X-RAYS:      CULTURES: N/A    LABS    Recent Labs      06/28/17   0820   WBC  7.7   HGB  12.8   HCT  40.7   PLT  214     Recent Labs      06/30/17   0500  06/29/17   0200  06/28/17   1720  06/28/17   1115  06/28/17   0810  06/28/17   0755   NA  147*  141   --    --    --   143   K  3.6  3.6   --    --    --   5.5*   CL  111*  102   --    --    --   106   GLU  131*  274*   --    --    --   95   CO2  28  27   --    --    --   28   BUN  30*  34*   --    --    --   22   CREA  1.26*  1.81*   --    --    --   1.14*   MG  1.8  1.8   --    --    --    --    TROIQ   --    --   0.15*  0.17*   --    --    INR   --    --    --    --   1.1   --          Assessment:     Hospital Problems  Date Reviewed: 6/30/2017          Codes Class Noted POA    Chronic renal failure (Chronic) ICD-10-CM: N18.9  ICD-9-CM: 585.9  6/30/2017 Yes        Cardiomyopathy (HCC) (Chronic) ICD-10-CM: I42.9  ICD-9-CM: 425.4  Unknown Yes         COPD (chronic obstructive pulmonary disease) (HCC) (Chronic) ICD-10-CM: J44.9  ICD-9-CM: 748  Unknown Yes    On BD, add steroids    Atrial fibrillation (HCC) ICD-10-CM: I48.91  ICD-9-CM: 427.31  Unknown Yes        Systolic heart failure (HCC) (Chronic) ICD-10-CM: I50.20  ICD-9-CM: 428.20  Unknown Yes        Chest pain ICD-10-CM: R07.9  ICD-9-CM: 786.50  6/28/2017 Yes    S/P LHC    * (Principal)NSTEMI (non-ST elevated myocardial infarction) (HCC) (Chronic) ICD-10-CM: I21.4  ICD-9-CM: 410.70  6/28/2017 Yes              Plan:     Albuterol, pulmicort  On 2-3 lpm continuously - home dose  Short course of steroids  Continue DOMI Matson    More than 50% of time documented was spent in face-to-face contact with the patient and in the care of the patient on the floor/unit where the patient is located. Lungs: Scattered wheezing bilaterally  Heart:  RRR with no Murmur/Rubs/Gallops  Extremity: No edema    Additional Comments: We will give the patient course of steroid, bronchodilators and continue IV diuretics. She need to continue her oxygen supplementation on a regular basis and likely need to maintain using Spiriva and Symbicort as an outpatient and use nebulizer 3 times daily until her respiratory status stabilized. She will need close follow-up by pulmonologist as an outpatient after returning home. I have spoken with and examined the patient. I agree with the above assessment and plan as documented.     Neeru Alamo MD

## 2017-06-30 NOTE — PROGRESS NOTES
Bedside shift change report given to Brian Covarrubias RN (oncoming nurse) by Daisy Tovar RN (offgoing nurse). Report included the following information SBAR, Kardex, Procedure Summary, Intake/Output, MAR, Recent Results, Med Rec Status and Cardiac Rhythm of VPacing.

## 2017-06-30 NOTE — DISCHARGE SUMMARY
[address] Cardiology Discharge Summary     Patient ID:  Bonilla Carnes  [id number]  73 y.o.  1945    Admit date: 6/28/2017    Discharge date:  7/1/17    Admitting Physician: Stephanie Mera MD     Discharge Physician: Juan Carlos Tucker NP/Dr. Michael Sahni    Admission Diagnoses: CHF  NSTEMI (non-ST elevated myocardial infarction) [facility])    Discharge Diagnoses:   Patient Active Problem List    Diagnosis Date Noted    CHF (congestive heart failure) ([facility] [address] 06/28/2017    Chest pain 06/28/2017    NSTEMI (non-ST elevated myocardial infarction) ([facility] [address] 06/28/2017    Cardiomyopathy ([facility] [address]     COPD (chronic obstructive pulmonary disease) ([facility] [address]     Atrial fibrillation ([facility] [address]     Systolic heart failure ([facility] [address]     Arthritis        Cardiology Procedures this admission:  Diagnostic left heart catheterization  EchoCardiogram  Consults: Pulmonary    Hospital Course: [facility] and transferred here as a direct admit for elevated troponins and mild CHF with BNP 2055. The patient was started on IV lasix and had minimal diuresis but the following morning was able to lie flat for invasive workup. Patient underwent cardiac catheterization by Dr. Rossy Carrera. Patient was found to have normal coronary arteries. So likely her levated troponins secondary to mild CHF. An echocardiogram showed -  Left ventricle: The ventricle was mildly to moderately dilated. Systolic function was moderately to markedly reduced. Ejection fraction was estimated in the range of 30 % to 35 %. There was severe diffuse hypokinesis. Wall Thickness was mildly increased. -  Right ventricle: Systolic function was reduced. -  Left atrium: The atrium was moderately to markedly dilated. -  Right atrium: The atrium was moderately dilated. -  Mitral valve: There was mild regurgitation. -  Tricuspid valve: There was mild regurgitation. Patient tolerated the procedure well and was taken to the telemetry floor for recovery.  The morning of 6/30/17, the patient is feeling better but still with some SOB. The patient has COPD and exacerbation noted as well during her hospital stay. The patient denies any recurrent chest pain. Patient's right femoral cath site was clean, dry and intact without hematoma or bruit. Patient's labs were WNL  (Cr today is 1.26 was 1.81 on admission). The patient remained more SOB than her baseline so pulmonary was consulted. [facility] with spiriva, symbicort and nebs tid. Patient was seen and examined by Dr. Tha Hackett and determined stable and ready for discharge. Patient was instructed on the importance of medication compliance. For maximized medical therapy for CHF, patient will continue Entresto and her beta blocker was changed to Toprol (approved for CHF). The patient will follow up with her regular cardiologists at [facility] in [place] a Dr. Lindsey Chew in 1-2 weeks. The patient will continue home O2 as well. DISPOSITION: The patient is being discharged home in stable condition on a low saturated fat, low cholesterol and low salt diet. The patient is instructed to advance activities as tolerated to the limit of fatigue or shortness of breath. The patient is instructed to avoid all heavy lifting, straining, stooping or squatting for 3-5 days. The patient is instructed to watch the cath site for bleeding/oozing; if seen, the patient is instructed to apply firm pressure with a clean cloth and call [facility] at [phone]. The patient is instructed to watch for signs of infection which include: increasing area of redness, fever/hot to touch or purulent drainage at the catheterization site. The patient is instructed not to soak in a bathtub for 7-10 days, but is cleared to shower. The patient is instructed to call the office or return to the ER for immediate evaluation for any shortness of breath or chest pain not relieved by NTG.         Discharge Exam:   Visit Vitals    /68 (BP 1 Location: Right arm, BP Patient Position: At rest)    Pulse 78    Temp 97 °F (36.1 °C)    Resp 19    Ht 5' 3\" (1.6 m)    Wt 71.8 kg (158 lb 3.2 oz)    SpO2 98%    BMI 28.02 kg/m2     Patient has been seen by Dr. Frankel Dear: see his progress note for exam details. Recent Results (from the past 24 hour(s))   PTT    Collection Time: 06/29/17  7:59 AM   Result Value Ref Range    aPTT 94.9 (HH) 23.5 - 31.7 SEC   MAGNESIUM    Collection Time: 06/30/17  5:00 AM   Result Value Ref Range    Magnesium 1.8 1.8 - 2.4 mg/dL   METABOLIC PANEL, BASIC    Collection Time: 06/30/17  5:00 AM   Result Value Ref Range    Sodium 147 (H) 136 - 145 mmol/L    Potassium 3.6 3.5 - 5.1 mmol/L    Chloride 111 (H) 98 - 107 mmol/L    CO2 28 21 - 32 mmol/L    Anion gap 8 7 - 16 mmol/L    Glucose 131 (H) 65 - 100 mg/dL    BUN 30 (H) 8 - 23 MG/DL    Creatinine 1.26 (H) 0.6 - 1.0 MG/DL    GFR est AA 54 (L) >60 ml/min/1.73m2    GFR est non-AA 45 (L) >60 ml/min/1.73m2    Calcium 7.9 (L) 8.3 - 10.4 MG/DL         Patient Instructions:   Current Discharge Medication List      START taking these medications    Details   metoprolol succinate (TOPROL-XL) 50 mg XL tablet Take 1 Tab by mouth daily. Qty: 30 Tab, Refills: 11      predniSONE (DELTASONE) 10 mg tablet Take 1 Tab by mouth daily (with breakfast). 4 tabs x 3 days then 3 tabs x 3 days then 2 tabs x 3 days then 1 tab x 3 days then stop med  Qty: 30 Tab, Refills: 0         CONTINUE these medications which have CHANGED    Details   albuterol sulfate (PROVENTIL;VENTOLIN) 2.5 mg/0.5 mL nebu nebulizer solution 0.5 mL by Nebulization route three (3) times daily. Qty: 0.5 mL, Refills: 0         CONTINUE these medications which have NOT CHANGED    Details   apixaban (ELIQUIS) 5 mg tablet Take 5 mg by mouth two (2) times a day. potassium chloride SA (MICRO-K) 10 mEq capsule Take 10 mEq by mouth two (2) times a day.  Indications: take 2 tablest 2 times a day HYDROcodone-acetaminophen (NORCO) 5-325 mg per tablet Take 1 Tab by mouth every six (6) hours as needed for Pain. omeprazole (PRILOSEC) 20 mg capsule Take 20 mg by mouth daily. Take one capsule every day before meals      budesonide-formoterol (SYMBICORT) 160-4.5 mcg/actuation HFA inhaler Take 2 Puffs by inhalation two (2) times a day. tiotropium (SPIRIVA WITH HANDIHALER) 18 mcg inhalation capsule Take 1 Cap by inhalation daily. albuterol (VENTOLIN HFA) 90 mcg/actuation inhaler Take 1 Puff by inhalation every four (4) hours as needed. sacubitril-valsartan (ENTRESTO) 49 mg/51 mg tablet Take 1 Tab by mouth two (2) times a day. oxyCODONE IR (ROXICODONE) 5 mg immediate release tablet Take 1 Tab by mouth every four (4) hours as needed for Pain for up to 15 doses. Max Daily Amount: 30 mg.  Qty: 15 Tab, Refills: 0      furosemide (LASIX) 40 mg tablet Take 40 mg by mouth daily. STOP taking these medications       megestrol (MEGACE) 40 mg tablet Comments:   Reason for Stopping:         metoprolol tartrate (LOPRESSOR) 50 mg tablet Comments:   Reason for Stopping:                     Signed:  DOMI Flynn  6/30/2017  7:47 AM    ATTENDING ADDENDUM:    Patient seen and examined by me. Agree with above note by physician extender. Key findings are:  No CP or SORIANO- breathing basically back to normal. Ready to go home. Low salt/fluid restricted/weight monitored diet discussed with patient and daughter. Call with increased weight gain, SOB, SORIANO, edema. Refilled all meds including pulmonary meds today. Emphasized follow up with primary cardiologist soon upon [facility]. CV- RRR without murmur, 8cm at 45 deg  Lungs- Clear bilaterally, decreased bibasilar without wheezing or crackles  Abd- soft, nontender, nondistended  Ext- no edema    Plan: As above.     Francis Brewer MD  [facility]  Pager [phone]

## 2017-06-30 NOTE — PROGRESS NOTES
Problem: Falls - Risk of  Goal: *Absence of falls  Outcome: Progressing Towards Goal  Patient progressing towards goal with no falls on current admission. Patient without confusion, agitation, or sensory perception deficits. Patient has mostly steady gait on ambulation. Personal belongings are within reach. Bed is in the low and locked position with side rails up x2. Yellow gripper socks to bilateral feet. Call light within reach and patient verbalizes understanding of use. Problem: Unstable angina/NSTEMI: Day 2  Goal: *Optimal pain control at patients stated goal  Outcome: Resolved/Met Date Met:  06/30/17  Patient has denied pain on current shift.        Goal: *Lungs clear or at baseline  Outcome: Progressing Towards Goal  Patient has some coarseness in lower lobes of bilateral lungs, but her oxygen saturations have been % on upto 3L/m O2 via NC.

## 2017-06-30 NOTE — PROGRESS NOTES
[facility] CARDIOLOGY PROGRESS NOTE           6/30/2017 12:06 PM    Admit Date: 6/28/2017      Subjective:   Initially seen this am for d/c. Got patient to walk ballesteros. SOB still worse than her baseline. No real diuersis with IV lasix but I&Os not accurate. Denies any chest pain. ROS:  Cardiovascular:  As noted above    Objective:      Vitals:    06/30/17 0229 06/30/17 0506 06/30/17 0833 06/30/17 0937   BP:  105/68  129/66   Pulse:  78  78   Resp:  19  18   Temp:  97 °F (36.1 °C)  98.4 °F (36.9 °C)   SpO2: 95% 98% 96% 99%   Weight:  71.8 kg (158 lb 3.2 oz)     Height:           Physical Exam:  General-No Acute Distress  Neck- supple, no JVD  CV- regular rate and rhythm no MRG  Lung- coarse BS with crackles in bases and few scattered rhonchi  Abd- soft, nontender, nondistended  Ext- no edema bilaterally. Skin- warm and dry    Data Review:   Recent Labs      06/30/17   0500  06/29/17   0200  06/28/17   1720  06/28/17   1115  06/28/17   0820  06/28/17   0810   NA  147*  141   --    --    --    --    K  3.6  3.6   --    --    --    --    MG  1.8  1.8   --    --    --    --    BUN  30*  34*   --    --    --    --    CREA  1.26*  1.81*   --    --    --    --    GLU  131*  274*   --    --    --    --    WBC   --    --    --    --   7.7   --    HGB   --    --    --    --   12.8   --    HCT   --    --    --    --   40.7   --    PLT   --    --    --    --   214   --    INR   --    --    --    --    --   1.1   TROIQ   --    --   0.15*  0.17*   --    --    CHOL   --   144   --    --    --    --    LDLC   --   65   --    --    --    --    HDL   --   69*   --    --    --    --        Assessment/Plan:     Principal Problem:    NSTEMI (non-ST elevated myocardial infarction) ([facility] [address] (6/28/2017)- likely elevated troponin demand ischemia in setting of CHF and COPD exacerbation. LHC with normal coronary arteries.  Echo showed moderate LV dysfunction with EF 30 % to 35 %,  severe diffuse hypokinesis, reduced RV function, and Mod LA/RA size. Active Problems:    Cardiomyopathy ([address] ()- see above with echo results.  On BB,  Entresto, and lasix      COPD (chronic obstructive pulmonary disease) ([facility]) ()- exacerbation: will consult pulmonary, discussed care with Dr. Dennise Miller fibrillation [facility]) ()- on eliquis at home      Systolic heart failure [facility]) ()- see above      Chest pain (6/28/2017)- [facility] with normal coronary arteries         Bambi Pa NP  6/30/2017 12:06 PM

## 2017-07-01 VITALS
HEIGHT: 63 IN | SYSTOLIC BLOOD PRESSURE: 101 MMHG | RESPIRATION RATE: 18 BRPM | WEIGHT: 155.5 LBS | DIASTOLIC BLOOD PRESSURE: 54 MMHG | BODY MASS INDEX: 27.55 KG/M2 | TEMPERATURE: 97.1 F | OXYGEN SATURATION: 95 % | HEART RATE: 75 BPM

## 2017-07-01 LAB
ANION GAP BLD CALC-SCNC: 9 MMOL/L (ref 7–16)
BUN SERPL-MCNC: 29 MG/DL (ref 8–23)
CALCIUM SERPL-MCNC: 8.1 MG/DL (ref 8.3–10.4)
CHLORIDE SERPL-SCNC: 107 MMOL/L (ref 98–107)
CO2 SERPL-SCNC: 28 MMOL/L (ref 21–32)
CREAT SERPL-MCNC: 1.36 MG/DL (ref 0.6–1)
GLUCOSE SERPL-MCNC: 199 MG/DL (ref 65–100)
MAGNESIUM SERPL-MCNC: 1.9 MG/DL (ref 1.8–2.4)
POTASSIUM SERPL-SCNC: 5.2 MMOL/L (ref 3.5–5.1)
SODIUM SERPL-SCNC: 144 MMOL/L (ref 136–145)

## 2017-07-01 PROCEDURE — 99232 SBSQ HOSP IP/OBS MODERATE 35: CPT | Performed by: INTERNAL MEDICINE

## 2017-07-01 PROCEDURE — 36592 COLLECT BLOOD FROM PICC: CPT

## 2017-07-01 PROCEDURE — 94760 N-INVAS EAR/PLS OXIMETRY 1: CPT

## 2017-07-01 PROCEDURE — 74011000250 HC RX REV CODE- 250: Performed by: NURSE PRACTITIONER

## 2017-07-01 PROCEDURE — 80048 BASIC METABOLIC PNL TOTAL CA: CPT | Performed by: NURSE PRACTITIONER

## 2017-07-01 PROCEDURE — 83735 ASSAY OF MAGNESIUM: CPT | Performed by: NURSE PRACTITIONER

## 2017-07-01 PROCEDURE — 94640 AIRWAY INHALATION TREATMENT: CPT

## 2017-07-01 PROCEDURE — 74011250636 HC RX REV CODE- 250/636: Performed by: INTERNAL MEDICINE

## 2017-07-01 PROCEDURE — 74011250637 HC RX REV CODE- 250/637: Performed by: PHYSICIAN ASSISTANT

## 2017-07-01 PROCEDURE — 74011000250 HC RX REV CODE- 250: Performed by: INTERNAL MEDICINE

## 2017-07-01 PROCEDURE — 77010033678 HC OXYGEN DAILY

## 2017-07-01 PROCEDURE — 74011250637 HC RX REV CODE- 250/637: Performed by: NURSE PRACTITIONER

## 2017-07-01 RX ORDER — ALBUTEROL SULFATE 90 UG/1
1 AEROSOL, METERED RESPIRATORY (INHALATION)
Qty: 1 INHALER | Refills: 5 | Status: SHIPPED | OUTPATIENT
Start: 2017-07-01

## 2017-07-01 RX ORDER — PREDNISONE 20 MG/1
40 TABLET ORAL
Status: DISCONTINUED | OUTPATIENT
Start: 2017-07-02 | End: 2017-07-01 | Stop reason: HOSPADM

## 2017-07-01 RX ORDER — METOPROLOL SUCCINATE 50 MG/1
50 TABLET, EXTENDED RELEASE ORAL DAILY
Qty: 30 TAB | Refills: 11 | Status: SHIPPED | OUTPATIENT
Start: 2017-07-01

## 2017-07-01 RX ORDER — BUDESONIDE AND FORMOTEROL FUMARATE DIHYDRATE 160; 4.5 UG/1; UG/1
2 AEROSOL RESPIRATORY (INHALATION) 2 TIMES DAILY
Qty: 1 INHALER | Refills: 5 | Status: SHIPPED | OUTPATIENT
Start: 2017-07-01

## 2017-07-01 RX ORDER — PREDNISONE 10 MG/1
10 TABLET ORAL
Qty: 30 TAB | Refills: 0 | Status: SHIPPED | OUTPATIENT
Start: 2017-07-01

## 2017-07-01 RX ORDER — ALBUTEROL SULFATE 2.5 MG/.5ML
2.5 SOLUTION RESPIRATORY (INHALATION) 3 TIMES DAILY
Qty: 45 ML | Refills: 1 | Status: SHIPPED | OUTPATIENT
Start: 2017-07-01 | End: 2017-07-31

## 2017-07-01 RX ORDER — ALBUTEROL SULFATE 2.5 MG/.5ML
2.5 SOLUTION RESPIRATORY (INHALATION) 3 TIMES DAILY
Qty: 0.5 ML | Refills: 0 | Status: SHIPPED
Start: 2017-07-01 | End: 2017-07-01

## 2017-07-01 RX ADMIN — FUROSEMIDE 40 MG: 40 TABLET ORAL at 08:27

## 2017-07-01 RX ADMIN — PANTOPRAZOLE SODIUM 40 MG: 40 TABLET, DELAYED RELEASE ORAL at 06:07

## 2017-07-01 RX ADMIN — APIXABAN 5 MG: 5 TABLET, FILM COATED ORAL at 06:07

## 2017-07-01 RX ADMIN — Medication 10 ML: at 06:07

## 2017-07-01 RX ADMIN — METHYLPREDNISOLONE SODIUM SUCCINATE 40 MG: 40 INJECTION, POWDER, FOR SOLUTION INTRAMUSCULAR; INTRAVENOUS at 04:14

## 2017-07-01 RX ADMIN — SACUBITRIL AND VALSARTAN 1 TABLET: 49; 51 TABLET, FILM COATED ORAL at 08:28

## 2017-07-01 RX ADMIN — ALBUTEROL SULFATE 2.5 MG: 2.5 SOLUTION RESPIRATORY (INHALATION) at 04:54

## 2017-07-01 RX ADMIN — BUDESONIDE 500 MCG: 0.5 SUSPENSION RESPIRATORY (INHALATION) at 08:06

## 2017-07-01 RX ADMIN — ALBUTEROL SULFATE 2.5 MG: 2.5 SOLUTION RESPIRATORY (INHALATION) at 00:07

## 2017-07-01 RX ADMIN — TIOTROPIUM BROMIDE 18 MCG: 18 CAPSULE ORAL; RESPIRATORY (INHALATION) at 08:41

## 2017-07-01 RX ADMIN — METOPROLOL SUCCINATE 50 MG: 50 TABLET, EXTENDED RELEASE ORAL at 08:27

## 2017-07-01 RX ADMIN — ALBUTEROL SULFATE 2.5 MG: 2.5 SOLUTION RESPIRATORY (INHALATION) at 08:06

## 2017-07-01 NOTE — DISCHARGE INSTRUCTIONS
Cardiac Catheterization/Angiography Discharge Instructions    *Check the puncture site frequently for swelling or bleeding. If you see any bleeding, lie down and apply pressure over the area with a clean town or washcloth. Notify your doctor for any redness, swelling, drainage or oozing from the puncture site. Notify your doctor for any fever or chills. *If the leg or arm with the puncture becomes cold, numb or painful, call [facility] at [phone]    *Activity should be limited for the next 48 hours. Climb stairs as little as possible and avoid any stooping, bending or strenuous activity for 48 hours. No heavy lifting (anything over 10 pounds) for three days. *Do not drive for 48 hours. *You may resume your usual diet. Drink more fluids than usual.    *Have a responsible person drive you home and stay with you for at least 24 hours after your heart catheterization/angiography. *You may remove the bandage from your Right Wrist in 24 hours. You may shower in 24 hours. No tub baths, hot tubs or swimming for one week. Do not place any lotions, creams, powders, ointments over the puncture site for one week. You may place a clean band-aid over the puncture site each day for 5 days. Change this daily. Chronic Obstructive Pulmonary Disease (COPD): Care Instructions  Your Care Instructions    Chronic obstructive pulmonary disease (COPD) is a general term for a group of lung diseases, including emphysema and chronic bronchitis. People with COPD have decreased airflow in and out of the lungs, which makes it hard to breathe. The airways also can get clogged with thick mucus. Cigarette smoking is a major cause of COPD. Although there is no cure for COPD, you can slow its progress. Following your treatment plan and taking care of yourself can help you feel better and live longer. Follow-up care is a key part of your treatment and safety.  Be sure to make and go to all appointments, and call your doctor if you are having problems. It's also a good idea to know your test results and keep a list of the medicines you take. How can you care for yourself at home? Staying healthy  · Do not smoke. This is the most important step you can take to prevent more damage to your lungs. If you need help quitting, talk to your doctor about stop-smoking programs and medicines. These can increase your chances of quitting for good. · Avoid colds and flu. Get a pneumococcal vaccine shot. If you have had one before, ask your doctor whether you need a second dose. Get the flu vaccine every fall. If you must be around people with colds or the flu, wash your hands often. · Avoid secondhand smoke, air pollution, and high altitudes. Also avoid cold, dry air and hot, humid air. Stay at home with your windows closed when air pollution is bad. Medicines and oxygen therapy  · Take your medicines exactly as prescribed. Call your doctor if you think you are having a problem with your medicine. · You may be taking medicines such as:  ¨ Bronchodilators. These help open your airways and make breathing easier. Bronchodilators are either short-acting (work for 6 to 9 hours) or long-acting (work for 24 hours). You inhale most bronchodilators, so they start to act quickly. Always carry your quick-relief inhaler with you in case you need it while you are away from home. ¨ Corticosteroids (prednisone, budesonide). These reduce airway inflammation. They come in pill or inhaled form. You must take these medicines every day for them to work well. · A spacer may help you get more inhaled medicine to your lungs. Ask your doctor or pharmacist if a spacer is right for you. If it is, ask how to use it properly. · Do not take any vitamins, over-the-counter medicine, or herbal products without talking to your doctor first.  · If your doctor prescribed antibiotics, take them as directed. Do not stop taking them just because you feel better. You need to take the full course of antibiotics. · Oxygen therapy boosts the amount of oxygen in your blood and helps you breathe easier. Use the flow rate your doctor has recommended, and do not change it without talking to your doctor first.  Activity  · Get regular exercise. Walking is an easy way to get exercise. Start out slowly, and walk a little more each day. · Pay attention to your breathing. You are exercising too hard if you cannot talk while you are exercising. · Take short rest breaks when doing household chores and other activities. · Learn breathing methods--such as breathing through pursed lips--to help you become less short of breath. · If your doctor has not set you up with a pulmonary rehabilitation program, talk to him or her about whether rehab is right for you. Rehab includes exercise programs, education about your disease and how to manage it, help with diet and other changes, and emotional support. Diet  · Eat regular, healthy meals. Use bronchodilators about 1 hour before you eat to make it easier to eat. Eat several small meals instead of three large ones. Drink beverages at the end of the meal. Avoid foods that are hard to chew. · Eat foods that contain protein so that you do not lose muscle mass. · Talk with your doctor if you gain too much weight or if you lose weight without trying. Mental health  · Talk to your family, friends, or a therapist about your feelings. It is normal to feel frightened, angry, hopeless, helpless, and even guilty. Talking openly about bad feelings can help you cope. If these feelings last, talk to your doctor. When should you call for help? Call 911 anytime you think you may need emergency care. For example, call if:  · You have severe trouble breathing. Call your doctor now or seek immediate medical care if:  · You have new or worse trouble breathing. · You cough up blood. · You have a fever.   Watch closely for changes in your health, and be sure to contact your doctor if:  · You cough more deeply or more often, especially if you notice more mucus or a change in the color of your mucus. · You have new or worse swelling in your legs or belly. · You are not getting better as expected. Where can you learn more? Go to http://billy-braulio.info/. Graciela Ortez in the search box to learn more about \"Chronic Obstructive Pulmonary Disease (COPD): Care Instructions. \"  Current as of: March 25, 2017  Content Version: 11.3  © 6520-5089 [employer]. Care instructions adapted under license by [employer] (which disclaims liability or warranty for this information). If you have questions about a medical condition or this instruction, always ask your healthcare professional. [address] any warranty or liability for your use of this information. Dilated Cardiomyopathy: Care Instructions  Your Care Instructions    Dilated cardiomyopathy is a condition that weakens your heart muscle and causes it to stretch, or dilate. When your heart muscle is weak, it can't pump out blood as well as it should. More blood stays in your heart after each heartbeat. As more blood fills and stays in the heart, the heart muscle stretches even more and gets even weaker. Many things can cause dilated cardiomyopathy. It can be caused by another disease or condition, such as high blood pressure or a heart attack. Some people have a family history of dilated cardiomyopathy. For some people, the cause is not known. You may not have any symptoms at first. Or you may have mild symptoms, such as feeling very tired or weak. If your heart gets weaker, you may develop heart failure. Heart failure means that your heart muscle doesn't pump as much blood as your body needs. If this happens, you will feel other symptoms such as shortness of breath or trouble breathing when you lie down.   The goal of treatment is to slow the disease and help you feel better. You may also have treatment for the cause of the cardiomyopathy. You will probably take a few medicines. If your doctor thinks it will help your heart and prevent problems, you may get a device such as a pacemaker. Self-care is another important part of your treatment. It includes the things you can do every day to feel better and stay as healthy as possible. Follow-up care is a key part of your treatment and safety. Be sure to make and go to all appointments, and call your doctor if you are having problems. It's also a good idea to know your test results and keep a list of the medicines you take. How can you care for yourself at home? Medicines  · Be safe with medicines. Take your medicines exactly as prescribed. Call your doctor if you think you are having a problem with your medicine. You may be taking some of the following medicines:  ¨ Angiotensin-converting enzyme (ACE) inhibitors or angiotensin II receptor blockers (ARBs). These make it easier for blood to flow. ¨ Diuretics. These help remove excess fluid from the body. ¨ Beta-blockers. These slow the heart rate and can help the heart fill with blood more completely. Heart-healthy lifestyle  · Be active. Exercise regularly, but don't exercise too hard. If you aren't already active, your doctor may want you to start exercising. But don't start until you have talked with your doctor to make an exercise program that is safe for you. · Do not smoke. Smoking can make a heart condition worse. If you need help quitting, talk to your doctor about stop-smoking programs and medicines. These can increase your chances of quitting for good. · Eat a heart-healthy diet. · Stay at a healthy weight. Lose weight if you need to. · If your doctor recommends it, limit sodium. This helps keep fluid from building up in your body. It may help you feel better. Weight monitoring  · Weigh yourself without clothing at the same time each day.  Record your weight. Call your doctor if you have a sudden weight gain, such as more than 2 to 3 pounds in a day or 5 pounds in a week. (Your doctor may suggest a different range of weight gain.) A sudden weight gain may mean that your condition is getting worse. When should you call for help? Call 911 anytime you think you may need emergency care. For example, call if:  · You have symptoms of sudden heart failure. These may include:  ¨ Severe trouble breathing. ¨ A fast or irregular heartbeat. ¨ Coughing up pink, foamy mucus. ¨ Passing out. Call your doctor now or seek immediate medical care if:  · You have new or changed symptoms of heart failure, such as:  ¨ New or increased shortness of breath. ¨ New or worse swelling in your legs, ankles, or feet. ¨ Sudden weight gain, such as more than 2 to 3 pounds in a day or 5 pounds in a week. (Your doctor may suggest a different range of weight gain.)  ¨ Feeling dizzy or lightheaded or like you may faint. ¨ Feeling so tired or weak that you cannot do your usual activities. ¨ Not sleeping well. Shortness of breath wakes you at night. You need extra pillows to prop yourself up to breathe easier. Watch closely for changes in your health, and be sure to contact your doctor if you have any problems. Where can you learn more? Go to http://billy-braulio.info/. Enter B164 in the search box to learn more about \"Dilated Cardiomyopathy: Care Instructions. \"  Current as of: March 17, 2017  Content Version: 11.3  © 1964-4421 [employer]. Care instructions adapted under license by [employer] (which disclaims liability or warranty for this information). If you have questions about a medical condition or this instruction, always ask your healthcare professional. [address] any warranty or liability for your use of this information.     Avoiding Triggers With Heart Failure: Care Instructions  Your Care Instructions  Triggers are anything that make your heart failure flare up. A flare-up is also called \"sudden heart failure\" or \"acute heart failure. \" When you have a flare-up, fluid builds up in your lungs, and you have problems breathing. You might need to go to the hospital. By watching for changes in your condition and avoiding triggers, you can prevent heart failure flare-ups. Follow-up care is a key part of your treatment and safety. Be sure to make and go to all appointments, and call your doctor if you are having problems. It's also a good idea to know your test results and keep a list of the medicines you take. How can you care for yourself at home? Watch for changes in your weight and condition  · Weigh yourself without clothing at the same time each day. Record your weight. Call your doctor if you have sudden weight gain, such as more than 2 to 3 pounds in a day or 5 pounds in a week. (Your doctor may suggest a different range of weight gain.) A sudden weight gain may mean that your heart failure is getting worse. · Keep a daily record of your symptoms. Write down any changes in how you feel, such as new shortness of breath, cough, or problems eating. Also record if your ankles are more swollen than usual and if you feel more tired than usual. Note anything that you ate or did that could have triggered these changes. Limit sodium  Sodium causes your body to hold on to extra water. This may cause your heart failure symptoms to get worse. People get most of their sodium from processed foods. Fast food and restaurant meals also tend to be very high in sodium. · Your doctor may suggest that you limit sodium to 2,000 milligrams (mg) a day or less. That is less than 1 teaspoon of salt a day, including all the salt you eat in cooking or in packaged foods. · Read food labels on cans and food packages. They tell you how much sodium you get in one serving. Check the serving size.  If you eat more than one serving, you are getting more sodium. · Be aware that sodium can come in forms other than salt, including monosodium glutamate (MSG), sodium citrate, and sodium bicarbonate (baking soda). MSG is often added to Asian food. You can sometimes ask for food without MSG or salt. · Slowly reducing salt will help you adjust to the taste. Take the salt shaker off the table. · Flavor your food with garlic, lemon juice, onion, vinegar, herbs, and spices instead of salt. Do not use soy sauce, steak sauce, onion salt, garlic salt, mustard, or ketchup on your food, unless it is labeled \"low-sodium\" or \"low-salt. \"  · Make your own salad dressings, sauces, and ketchup without adding salt. · Use fresh or frozen ingredients, instead of canned ones, whenever you can. Choose low-sodium canned goods. · Eat less processed food and food from restaurants, including fast food. Exercise as directed  Moderate, regular exercise is very good for your heart. It improves your blood flow and helps control your weight. But too much exercise can stress your heart and cause a heart failure flare-up. · Check with your doctor before you start an exercise program.  · Walking is an easy way to get exercise. Start out slowly. Gradually increase the length and pace of your walk. Swimming, riding a bike, and using a treadmill are also good forms of exercise. · When you exercise, watch for signs that your heart is working too hard. You are pushing yourself too hard if you cannot talk while you are exercising. If you become short of breath or dizzy or have chest pain, stop, sit down, and rest.  · Do not exercise when you do not feel well. Take medicines correctly  · Take your medicines exactly as prescribed. Call your doctor if you think you are having a problem with your medicine. · Make a list of all the medicines you take. Include those prescribed to you by other doctors and any over-the-counter medicines, vitamins, or supplements you take.  Take this list with you when you go to any doctor. · Take your medicines at the same time every day. It may help you to post a list of all the medicines you take every day and what time of day you take them. · Make taking your medicine as simple as you can. Plan times to take your medicines when you are doing other things, such as eating a meal or getting ready for bed. This will make it easier to remember to take your medicines. · Get organized. Use helpful tools, such as daily or weekly pill containers. When should you call for help? Call 911 if you have symptoms of sudden heart failure such as:  · You have severe trouble breathing. · You cough up pink, foamy mucus. · You have a new irregular or rapid heartbeat. Call your doctor now or seek immediate medical care if:  · You have new or increased shortness of breath. · You are dizzy or lightheaded, or you feel like you may faint. · You have sudden weight gain, such as more than 2 to 3 pounds in a day or 5 pounds in a week. (Your doctor may suggest a different range of weight gain.)  · You have increased swelling in your legs, ankles, or feet. · You are suddenly so tired or weak that you cannot do your usual activities. Watch closely for changes in your health, and be sure to contact your doctor if you develop new symptoms. Where can you learn more? Go to http://billy-braulio.info/. Enter X731 in the search box to learn more about \"Avoiding Triggers With Heart Failure: Care Instructions. \"  Current as of: February 23, 2017  Content Version: 11.3  © 2485-7574 [employer]. Care instructions adapted under license by [employer] (which disclaims liability or warranty for this information). If you have questions about a medical condition or this instruction, always ask your healthcare professional. [address] any warranty or liability for your use of this information.     DISCHARGE SUMMARY from Nurse    The following personal items are in your possession at time of discharge:    Dental Appliances: None  Visual Aid: Glasses, With patient     Home Medications: None  Jewelry: Bracelet, Other (comment), Earrings (Ankle braclet )  Clothing: Pants, Slippers, Socks, With patient  Other Valuables: Cell Phone     PATIENT INSTRUCTIONS:    After general anesthesia or intravenous sedation, for 24 hours or while taking prescription Narcotics:  · Limit your activities  · Do not drive and operate hazardous machinery  · Do not make important personal or business decisions  · Do  not drink alcoholic beverages  · If you have not urinated within 8 hours after discharge, please contact your surgeon on call. Report the following to your surgeon:  · Excessive pain, swelling, redness or odor of or around the surgical area  · Temperature over 100.5  · Nausea and vomiting lasting longer than 4 hours or if unable to take medications  · Any signs of decreased circulation or nerve impairment to extremity: change in color, persistent  numbness, tingling, coldness or increase pain  · Any questions    *  Please give a list of your current medications to your Primary Care Provider. *  Please update this list whenever your medications are discontinued, doses are      changed, or new medications (including over-the-counter products) are added. *  Please carry medication information at all times in case of emergency situations. These are general instructions for a healthy lifestyle:    No smoking/ No tobacco products/ Avoid exposure to second hand smoke    Surgeon General's Warning:  Quitting smoking now greatly reduces serious risk to your health.     Obesity, smoking, and sedentary lifestyle greatly increases your risk for illness    A healthy diet, regular physical exercise & weight monitoring are important for maintaining a healthy lifestyle    You may be retaining fluid if you have a history of heart failure or if you experience any of the following symptoms:  Weight gain of 3 pounds or more overnight or 5 pounds in a week, increased swelling in our hands or feet or shortness of breath while lying flat in bed. Please call your doctor as soon as you notice any of these symptoms; do not wait until your next office visit. Recognize signs and symptoms of STROKE:    F-face looks uneven    A-arms unable to move or move unevenly    S-speech slurred or non-existent    T-time-call 911 as soon as signs and symptoms begin-DO NOT go       Back to bed or wait to see if you get better-TIME IS BRAIN. Warning Signs of HEART ATTACK     Call 911 if you have these symptoms:   Chest discomfort. Most heart attacks involve discomfort in the center of the chest that lasts more than a few minutes, or that goes away and comes back. It can feel like uncomfortable pressure, squeezing, fullness, or pain.  Discomfort in other areas of the upper body. Symptoms can include pain or discomfort in one or both arms, the back, neck, jaw, or stomach.  Shortness of breath with or without chest discomfort.  Other signs may include breaking out in a cold sweat, nausea, or lightheadedness. Don't wait more than five minutes to call 911 - MINUTES MATTER! Fast action can save your life. Calling 911 is almost always the fastest way to get lifesaving treatment. Emergency Medical Services staff can begin treatment when they arrive -- up to an hour sooner than if someone gets to the hospital by car. The discharge information has been reviewed with the patient. The patient verbalized understanding. Discharge medications reviewed with the patient and appropriate educational materials and side effects teaching were provided.

## 2017-07-01 NOTE — PROGRESS NOTES
Bedside shift change report given to Miladys Jean RN (oncoming nurse) by Catherine Galdamez RN (offgoing nurse). Report included the following information SBAR, Kardex, ED Summary, Procedure Summary, Intake/Output, MAR, Recent Results, Med Rec Status and Cardiac Rhythm of VPacing.

## 2017-07-01 NOTE — PROGRESS NOTES
Tank Hernandez  Admission Date: 6/28/2017             Daily Progress Note: 7/1/2017    The patient's chart is reviewed and the patient is discussed with the staff. This patient has been seen and evaluated at the request of Dr. Jacklyn Barrera.      Patient is a 73 y.o. female presents with SOB. She presented with acute heart failure in the setting of ICD with EF 35%. Her BNP was 2055. She was diuresed and underwent LHC with normal coronaries. She has underlying COPD. She lives in [place] and is here visiting family. At home, she is on symbicort, albuterol, and spiriva at home. She has Stage IV COPD on continuous oxygen at home. CXR without acute abnormality. We were asked to see her for AECOPD.      Her family reports that she is not back to baseline; she is wheezing and has SORIANO. She denies hemoptysis, chest pain, chills, fever or productive cough.        Subjective:     Feels better, cough with beige sputum production      Current Facility-Administered Medications   Medication Dose Route Frequency    [START ON 7/2/2017] predniSONE (DELTASONE) tablet 40 mg  40 mg Oral DAILY WITH BREAKFAST    apixaban (ELIQUIS) tablet 5 mg  5 mg Oral Q12H    albuterol (PROVENTIL VENTOLIN) nebulizer solution 2.5 mg  2.5 mg Nebulization Q4H RT    sodium chloride (NS) flush 5-10 mL  5-10 mL IntraVENous Q8H    sodium chloride (NS) flush 5-10 mL  5-10 mL IntraVENous PRN    furosemide (LASIX) tablet 40 mg  40 mg Oral DAILY    oxyCODONE IR (ROXICODONE) tablet 5 mg  5 mg Oral Q4H PRN    sacubitril-valsartan (ENTRESTO) 49-51 mg tablet 1 Tab  1 Tab Oral BID    tiotropium (SPIRIVA) inhalation capsule 18 mcg  1 Cap Inhalation DAILY    albuterol (PROVENTIL HFA, VENTOLIN HFA, PROAIR HFA) inhaler 1 Puff  1 Puff Inhalation Q4H PRN    budesonide (PULMICORT) 500 mcg/2 ml nebulizer suspension   Nebulization BID RT    pantoprazole (PROTONIX) tablet 40 mg  40 mg Oral ACB    morphine injection 2 mg  2 mg IntraVENous Q4H PRN    metoprolol succinate (TOPROL-XL) XL tablet 50 mg  50 mg Oral DAILY       Review of Systems    Constitutional: negative for fever, chills, sweats  Cardiovascular: negative for chest pain, palpitations, syncope, edema  Gastrointestinal:  negative for dysphagia, reflux, vomiting, diarrhea, abdominal pain, or melena  Neurologic:  negative for focal weakness, numbness, headache    Objective:     Vitals:    06/30/17 2116 07/01/17 0007 07/01/17 0037 07/01/17 0553   BP: 116/53  102/59 103/60   Pulse: 75  76 78   Resp: 16  16 16   Temp: 98 °F (36.7 °C)  97 °F (36.1 °C) 97.8 °F (36.6 °C)   SpO2: 99% 99% 100% 100%   Weight:    155 lb 8 oz (70.5 kg)   Height:         Intake and Output:   06/29 1901 - 07/01 0700  In: 310 [P.O.:310]  Out: 2000 [Urine:2000]       Physical Exam:   Constitution:  the patient is well developed and in no acute distress  EENMT:  Sclera clear, pupils equal, oral mucosa moist  Respiratory: diminishes ,scattered wheezing  Cardiovascular:  RRR without M,G,R  Gastrointestinal: soft and non-tender; with positive bowel sounds. Musculoskeletal: warm without cyanosis. There is no lower leg edema. Skin:  no jaundice or rashes, no wounds   Neurologic: no gross neuro deficits     Psychiatric:  alert and oriented x 3    CXR:       LAB     Recent Labs      06/28/17   0820  06/28/17   0810   WBC  7.7   --    HGB  12.8   --    HCT  40.7   --    PLT  214   --    INR   --   1.1     Recent Labs      07/01/17   0400  06/30/17   0500  06/29/17   0200  06/28/17   1720  06/28/17   1115   NA  144  147*  141   --    --    K  5.2*  3.6  3.6   --    --    CL  107  111*  102   --    --    CO2  28 28 27   --    --    GLU  199*  131*  274*   --    --    BUN  29*  30*  34*   --    --    CREA  1.36*  1.26*  1.81*   --    --    MG  1.9  1.8  1.8   --    --    CA  8.1*  7.9*  8.2*   --    --    TROIQ   --    --    --   0.15*  0.17*     No results for input(s): PH, PCO2, PO2, HCO3 in the last 72 hours.   No results for input(s): LCAD, LAC in the last 72 hours. Assessment:  (Medical Decision Making)     Hospital Problems  Date Reviewed: 6/30/2017          Codes Class Noted POA    Chronic renal failure (Chronic) ICD-10-CM: N18.9  ICD-9-CM: 585.9  6/30/2017 Yes        Cardiomyopathy ([facility] (Chronic) ICD-10-CM: I42.9  ICD-9-CM: 425.4  Unknown Yes        COPD (chronic obstructive pulmonary disease) (HCC) (Chronic) ICD-10-CM: J44.9  ICD-9-CM: 506  Unknown Yes        Atrial fibrillation ([facility] ICD-10-CM: I48.91  ICD-9-CM: 427.31  Unknown Yes        Systolic heart failure (HCC) (Chronic) ICD-10-CM: I50.20  ICD-9-CM: 428.20  Unknown Yes        Chest pain ICD-10-CM: R07.9  ICD-9-CM: 786.50  6/28/2017 Yes        * (Principal)NSTEMI (non-ST elevated myocardial infarction) ([facility] (Chronic) ICD-10-CM: I21.4  ICD-9-CM: 410.70  6/28/2017 Yes              Plan:  (Medical Decision Making)     --continue home dose 02  - continue nebs  -wean prednisone  - she has pulmonologist in [place] where she will eventually follow up    More than 50% of the time documented was spent in face-to-face contact with the patient and in the care of the patient on the floor/unit where the patient is located.     London Grimes MD

## 2017-07-01 NOTE — PROGRESS NOTES
Discharge instructions given and reviewed with patient and daughter. All questions answered. IV and heart monitor removed.

## 2017-08-01 ENCOUNTER — APPOINTMENT (OUTPATIENT)
Dept: GENERAL RADIOLOGY | Age: 72
End: 2017-08-01
Attending: EMERGENCY MEDICINE
Payer: MEDICARE

## 2017-08-01 ENCOUNTER — HOSPITAL ENCOUNTER (EMERGENCY)
Age: 72
Discharge: HOME | End: 2017-08-01
Attending: EMERGENCY MEDICINE
Payer: MEDICARE

## 2017-08-01 VITALS
OXYGEN SATURATION: 98 % | HEART RATE: 76 BPM | SYSTOLIC BLOOD PRESSURE: 125 MMHG | RESPIRATION RATE: 18 BRPM | WEIGHT: 154 LBS | HEIGHT: 63 IN | DIASTOLIC BLOOD PRESSURE: 62 MMHG | BODY MASS INDEX: 27.29 KG/M2 | TEMPERATURE: 98.9 F

## 2017-08-01 DIAGNOSIS — B34.9 VIRAL SYNDROME: Primary | ICD-10-CM

## 2017-08-01 LAB
ALBUMIN SERPL BCP-MCNC: 2.8 G/DL (ref 3.2–4.6)
ALBUMIN/GLOB SERPL: 0.6 {RATIO} (ref 1.2–3.5)
ALP SERPL-CCNC: 61 U/L (ref 50–136)
ALT SERPL-CCNC: 20 U/L (ref 12–65)
ANION GAP BLD CALC-SCNC: 7 MMOL/L (ref 7–16)
AST SERPL W P-5'-P-CCNC: 26 U/L (ref 15–37)
ATRIAL RATE: 88 BPM
BASOPHILS # BLD AUTO: 0 K/UL (ref 0–0.2)
BASOPHILS # BLD: 0 % (ref 0–2)
BILIRUB SERPL-MCNC: 0.7 MG/DL (ref 0.2–1.1)
BNP SERPL-MCNC: 1425 PG/ML
BUN SERPL-MCNC: 22 MG/DL (ref 8–23)
CALCIUM SERPL-MCNC: 9 MG/DL (ref 8.3–10.4)
CALCULATED P AXIS, ECG09: 79 DEGREES
CALCULATED R AXIS, ECG10: -97 DEGREES
CALCULATED T AXIS, ECG11: 72 DEGREES
CHLORIDE SERPL-SCNC: 105 MMOL/L (ref 98–107)
CO2 SERPL-SCNC: 27 MMOL/L (ref 21–32)
CREAT SERPL-MCNC: 1.43 MG/DL (ref 0.6–1)
DIAGNOSIS, 93000: NORMAL
DIFFERENTIAL METHOD BLD: ABNORMAL
EOSINOPHIL # BLD: 0.1 K/UL (ref 0–0.8)
EOSINOPHIL NFR BLD: 1 % (ref 0.5–7.8)
ERYTHROCYTE [DISTWIDTH] IN BLOOD BY AUTOMATED COUNT: 13.1 % (ref 11.9–14.6)
GLOBULIN SER CALC-MCNC: 4.7 G/DL (ref 2.3–3.5)
GLUCOSE SERPL-MCNC: 106 MG/DL (ref 65–100)
HCT VFR BLD AUTO: 35.3 % (ref 35.8–46.3)
HGB BLD-MCNC: 11.8 G/DL (ref 11.7–15.4)
IMM GRANULOCYTES # BLD: 0.1 K/UL (ref 0–0.5)
IMM GRANULOCYTES NFR BLD AUTO: 0.5 % (ref 0–5)
LYMPHOCYTES # BLD AUTO: 19 % (ref 13–44)
LYMPHOCYTES # BLD: 1.9 K/UL (ref 0.5–4.6)
MCH RBC QN AUTO: 32.1 PG (ref 26.1–32.9)
MCHC RBC AUTO-ENTMCNC: 33.4 G/DL (ref 31.4–35)
MCV RBC AUTO: 95.9 FL (ref 79.6–97.8)
MONOCYTES # BLD: 0.6 K/UL (ref 0.1–1.3)
MONOCYTES NFR BLD AUTO: 7 % (ref 4–12)
NEUTS SEG # BLD: 7.1 K/UL (ref 1.7–8.2)
NEUTS SEG NFR BLD AUTO: 73 % (ref 43–78)
PLATELET # BLD AUTO: 258 K/UL (ref 150–450)
PMV BLD AUTO: 10.4 FL (ref 10.8–14.1)
POTASSIUM SERPL-SCNC: 3.7 MMOL/L (ref 3.5–5.1)
PROT SERPL-MCNC: 7.5 G/DL (ref 6.3–8.2)
Q-T INTERVAL, ECG07: 458 MS
QRS DURATION, ECG06: 170 MS
QTC CALCULATION (BEZET), ECG08: 528 MS
RBC # BLD AUTO: 3.68 M/UL (ref 4.05–5.25)
SODIUM SERPL-SCNC: 139 MMOL/L (ref 136–145)
TROPONIN I SERPL-MCNC: 0.43 NG/ML (ref 0.02–0.05)
TROPONIN I SERPL-MCNC: 0.44 NG/ML (ref 0.02–0.05)
VENTRICULAR RATE, ECG03: 80 BPM
WBC # BLD AUTO: 9.7 K/UL (ref 4.3–11.1)

## 2017-08-01 PROCEDURE — 81003 URINALYSIS AUTO W/O SCOPE: CPT | Performed by: EMERGENCY MEDICINE

## 2017-08-01 PROCEDURE — 93005 ELECTROCARDIOGRAM TRACING: CPT | Performed by: EMERGENCY MEDICINE

## 2017-08-01 PROCEDURE — 74011250636 HC RX REV CODE- 250/636: Performed by: EMERGENCY MEDICINE

## 2017-08-01 PROCEDURE — 80053 COMPREHEN METABOLIC PANEL: CPT | Performed by: EMERGENCY MEDICINE

## 2017-08-01 PROCEDURE — 99285 EMERGENCY DEPT VISIT HI MDM: CPT | Performed by: EMERGENCY MEDICINE

## 2017-08-01 PROCEDURE — 96361 HYDRATE IV INFUSION ADD-ON: CPT | Performed by: EMERGENCY MEDICINE

## 2017-08-01 PROCEDURE — 96375 TX/PRO/DX INJ NEW DRUG ADDON: CPT | Performed by: EMERGENCY MEDICINE

## 2017-08-01 PROCEDURE — 85025 COMPLETE CBC W/AUTO DIFF WBC: CPT | Performed by: EMERGENCY MEDICINE

## 2017-08-01 PROCEDURE — 83880 ASSAY OF NATRIURETIC PEPTIDE: CPT | Performed by: EMERGENCY MEDICINE

## 2017-08-01 PROCEDURE — 84484 ASSAY OF TROPONIN QUANT: CPT | Performed by: EMERGENCY MEDICINE

## 2017-08-01 PROCEDURE — 71020 XR CHEST PA LAT: CPT

## 2017-08-01 PROCEDURE — 96374 THER/PROPH/DIAG INJ IV PUSH: CPT | Performed by: EMERGENCY MEDICINE

## 2017-08-01 RX ORDER — MORPHINE SULFATE 2 MG/ML
2 INJECTION, SOLUTION INTRAMUSCULAR; INTRAVENOUS
Status: COMPLETED | OUTPATIENT
Start: 2017-08-01 | End: 2017-08-01

## 2017-08-01 RX ORDER — ONDANSETRON 2 MG/ML
4 INJECTION INTRAMUSCULAR; INTRAVENOUS
Status: COMPLETED | OUTPATIENT
Start: 2017-08-01 | End: 2017-08-01

## 2017-08-01 RX ORDER — LOPERAMIDE HYDROCHLORIDE 2 MG/1
2 CAPSULE ORAL
Qty: 20 CAP | Refills: 0 | Status: SHIPPED | OUTPATIENT
Start: 2017-08-01 | End: 2017-08-11

## 2017-08-01 RX ORDER — ONDANSETRON 4 MG/1
4 TABLET, ORALLY DISINTEGRATING ORAL
Qty: 20 TAB | Refills: 0 | Status: SHIPPED | OUTPATIENT
Start: 2017-08-01

## 2017-08-01 RX ADMIN — MORPHINE SULFATE 2 MG: 2 INJECTION, SOLUTION INTRAMUSCULAR; INTRAVENOUS at 16:33

## 2017-08-01 RX ADMIN — ONDANSETRON 4 MG: 2 INJECTION INTRAMUSCULAR; INTRAVENOUS at 14:54

## 2017-08-01 RX ADMIN — SODIUM CHLORIDE, SODIUM LACTATE, POTASSIUM CHLORIDE, AND CALCIUM CHLORIDE 1000 ML: 600; 310; 30; 20 INJECTION, SOLUTION INTRAVENOUS at 14:17

## 2017-08-01 NOTE — ED NOTES
The patient was given their discharge instructions and  was given prescriptions. The  patient verbalized understanding and had no additional questions. The patient was alert and was discharged via Wheelchair, without additional complaints at time of discharge.   No apparent distress noted

## 2017-08-01 NOTE — ED NOTES
Pt caregiver states she does not want the pt to have an IV access at this time because \"they already got blood and she is not in distress right now\" This RN explained to patient the importance of IV access with patient's medical hx and presenting complaints. Pt caregiver declined IV access at this time. MDs notified.

## 2017-08-01 NOTE — DISCHARGE INSTRUCTIONS

## 2017-08-01 NOTE — ED PROVIDER NOTES
HPI Comments: Patient is a 70-year-old female who is coming in with multiple complaints. Primarily that she just feels fatigued and tired. She is also had a little bit of vomiting and diarrhea  And chills at home as well as some congestion. She has had some headaches in the past.  She had an elevated troponin recently and was admitted for this she had a cardiac catheter done which showed normal coronary arteries. Patient is a 73 y.o. female presenting with fatigue. The history is provided by the patient. Fatigue   Associated symptoms include shortness of breath, vomiting and nausea. Pertinent negatives include no chest pain. Past Medical History:   Diagnosis Date    Arthritis     Atrial fibrillation ([facility]     Cardiomyopathy ([facility]     Chronic obstructive pulmonary disease (HCC)     COPD (chronic obstructive pulmonary disease) ([facility]     Hypertension     Ill-defined condition     Anemia     Systolic heart failure (HCC)        Past Surgical History:   Procedure Laterality Date    HX IMPLANTABLE CARDIOVERTER DEFIBRILLATOR      HX ORTHOPAEDIC      right foot    HX ORTHOPAEDIC      right hip replacement     HX SHOULDER ARTHROSCOPY           History reviewed. No pertinent family history. Social History     Social History    Marital status:      Spouse name: N/A    Number of children: N/A    Years of education: N/A     Occupational History    Not on file. Social History Main Topics    Smoking status: Former Smoker    Smokeless tobacco: Never Used    Alcohol use No    Drug use: Not on file    Sexual activity: Not on file     Other Topics Concern    Not on file     Social History Narrative         ALLERGIES: Aspirin    Review of Systems   Constitutional: Positive for chills and fatigue. Negative for activity change and appetite change. HENT: Positive for congestion. Respiratory: Positive for shortness of breath. Negative for wheezing and stridor.     Cardiovascular: Negative for chest pain and palpitations. Gastrointestinal: Positive for diarrhea, nausea and vomiting. Negative for abdominal pain. Musculoskeletal: Negative for neck pain and neck stiffness. Skin: Negative. Neurological: Negative for dizziness, facial asymmetry and light-headedness. All other systems reviewed and are negative. Vitals:    08/01/17 1241 08/01/17 1534   BP: 112/59 139/65   Pulse: 80 81   Resp: 16    Temp: 99.9 °F (37.7 °C)    SpO2: 99% 98%   Weight: 69.9 kg (154 lb)    Height: 5' 3\" (1.6 m)             Physical Exam   Constitutional: She is oriented to person, place, and time. She appears well-developed and well-nourished. No distress. HENT:   Head: Normocephalic and atraumatic. Eyes: Conjunctivae are normal. No scleral icterus. Neck: Normal range of motion. Neck supple. Cardiovascular: Normal rate, regular rhythm and normal heart sounds. Pulmonary/Chest: Effort normal and breath sounds normal. No stridor. No respiratory distress. She has no wheezes. She has no rales. She exhibits no tenderness. Abdominal: Soft. There is no tenderness. There is no rebound and no guarding. Neurological: She is alert and oriented to person, place, and time. No focal weakness   Skin: Skin is warm and dry. No rash noted. She is not diaphoretic. Psychiatric: She has a normal mood and affect. Her behavior is normal.   Nursing note and vitals reviewed. MDM  Number of Diagnoses or Management Options  Viral syndrome:   Diagnosis management comments: Patient has symptoms of a viral syndrome she has no chest pain or shortness of breath. Troponins are slightly up she had clean coronary arteries last month and  Her BMP has improved slightly. Concern for possible viral irritation of the pericardium myocardium for  troponin leak. I have discussed case with cardiology and they feel troponin is likely due to chronic cardiomyopathy. Patient and family comfortable with outpatient plan. Elisha Lucio MD; 8/1/2017 @5:47 PM Voice dictation software was used during the making of this note. This software is not perfect and grammatical and other typographical errors may be present.   This note has not been proofread for errors.  ===================================================================        Amount and/or Complexity of Data Reviewed  Clinical lab tests: ordered and reviewed (Results for orders placed or performed during the hospital encounter of 08/01/17  -CBC WITH AUTOMATED DIFF       Result                                            Value                         Ref Range                       WBC                                               9.7                           4.3 - 11.1 K/uL                 RBC                                               3.68 (L)                      4.05 - 5.25 M/uL                HGB                                               11.8                          11.7 - 15.4 g/dL                HCT                                               35.3 (L)                      35.8 - 46.3 %                   MCV                                               95.9                          79.6 - 97.8 FL                  MCH                                               32.1                          26.1 - 32.9 PG                  MCHC                                              33.4                          31.4 - 35.0 g/dL                RDW                                               13.1                          11.9 - 14.6 %                   PLATELET                                          258                           150 - 450 K/uL                  MPV                                               10.4 (L)                      10.8 - 14.1 FL                  DF                                                AUTOMATED                                                     NEUTROPHILS                                       73                            43 - 78 %                       LYMPHOCYTES                                       19                            13 - 44 %                       MONOCYTES                                         7                             4.0 - 12.0 %                    EOSINOPHILS                                       1                             0.5 - 7.8 %                     BASOPHILS                                         0                             0.0 - 2.0 %                     IMMATURE GRANULOCYTES                             0.5                           0.0 - 5.0 %                     ABS. NEUTROPHILS                                  7.1                           1.7 - 8.2 K/UL                  ABS. LYMPHOCYTES                                  1.9                           0.5 - 4.6 K/UL                  ABS. MONOCYTES                                    0.6                           0.1 - 1.3 K/UL                  ABS. EOSINOPHILS                                  0.1                           0.0 - 0.8 K/UL                  ABS. BASOPHILS                                    0.0                           0.0 - 0.2 K/UL                  ABS. IMM.  GRANS.                                  0.1                           0.0 - 0.5 K/UL             -METABOLIC PANEL, COMPREHENSIVE       Result                                            Value                         Ref Range                       Sodium                                            139                           136 - 145 mmol/L                Potassium                                         3.7                           3.5 - 5.1 mmol/L                Chloride                                          105                           98 - 107 mmol/L                 CO2                                               27                            21 - 32 mmol/L                  Anion gap                                         7                             7 - 16 mmol/L Glucose                                           106 (H)                       65 - 100 mg/dL                  BUN                                               22                            8 - 23 MG/DL                    Creatinine                                        1.43 (H)                      0.6 - 1.0 MG/DL                 GFR est AA                                        47 (L)                        >60 ml/min/1.73m2               GFR est non-AA                                    39 (L)                        >60 ml/min/1.73m2               Calcium                                           9.0                           8.3 - 10.4 MG/DL                Bilirubin, total                                  0.7                           0.2 - 1.1 MG/DL                 ALT (SGPT)                                        20                            12 - 65 U/L                     AST (SGOT)                                        26                            15 - 37 U/L                     Alk. phosphatase                                  61                            50 - 136 U/L                    Protein, total                                    7.5                           6.3 - 8.2 g/dL                  Albumin                                           2.8 (L)                       3.2 - 4.6 g/dL                  Globulin                                          4.7 (H)                       2.3 - 3.5 g/dL                  A-G Ratio                                         0.6 (L)                       1.2 - 3.5                  -TROPONIN I       Result                                            Value                         Ref Range                       Troponin-I, Qt.                                   0.43 (HH)                     0.02 - 0.05 NG/ML          -TROPONIN I       Result                                            Value                         Ref Range Troponin-I, Qt.                                   0.44 (HH)                     0.02 - 0.05 NG/ML          -BNP       Result                                            Value                         Ref Range                       BNP                                               1425                          pg/mL                     )  Tests in the radiology section of CPT®: ordered and reviewed (Xr Chest Pa Lat    Result Date: 8/1/2017  CHEST X-RAY, 2 views 8/1/2017 History: Acute moderate weakness, cough, fever, and chills for 3 days. Technique: PA and lateral views of the chest. Comparison: Chest x-ray 6/28/2017 Findings: A grossly stable left-sided intracardiac device is seen. The cardiac silhouette is mild to moderately enlarged although stable. The lungs are expanded without evidence for pneumothorax. No consolidation, or evidence of pleural effusion is seen. Stable interstitial prominence is seen of the lungs. The bony thorax demonstrates no acute changes. Stable postsurgical changes are seen in the left lung apex, and of the right shoulder. The upper abdomen is unremarkable in appearance. IMPRESSION: 1.  Stable cardiomegaly without evolving acute changes seen. [place]     )  Independent visualization of images, tracings, or specimens: yes      ED Course       Procedures

## 2017-08-07 ENCOUNTER — APPOINTMENT (OUTPATIENT)
Dept: GENERAL RADIOLOGY | Age: 72
End: 2017-08-07
Attending: EMERGENCY MEDICINE
Payer: MEDICARE

## 2017-08-07 ENCOUNTER — HOSPITAL ENCOUNTER (EMERGENCY)
Age: 72
Discharge: HOME | End: 2017-08-08
Attending: EMERGENCY MEDICINE
Payer: MEDICARE

## 2017-08-07 DIAGNOSIS — R77.8 ELEVATED TROPONIN: Primary | ICD-10-CM

## 2017-08-07 DIAGNOSIS — E86.0 DEHYDRATION: ICD-10-CM

## 2017-08-07 DIAGNOSIS — I50.9 CONGESTIVE HEART FAILURE, UNSPECIFIED CONGESTIVE HEART FAILURE CHRONICITY, UNSPECIFIED CONGESTIVE HEART FAILURE TYPE: ICD-10-CM

## 2017-08-07 LAB
BASOPHILS # BLD AUTO: 0 K/UL (ref 0–0.2)
BASOPHILS # BLD: 0 % (ref 0–2)
D DIMER PPP FEU-MCNC: 0.49 UG/ML(FEU)
DIFFERENTIAL METHOD BLD: ABNORMAL
EOSINOPHIL # BLD: 0.1 K/UL (ref 0–0.8)
EOSINOPHIL NFR BLD: 2 % (ref 0.5–7.8)
ERYTHROCYTE [DISTWIDTH] IN BLOOD BY AUTOMATED COUNT: 13.7 % (ref 11.9–14.6)
HCT VFR BLD AUTO: 32.2 % (ref 35.8–46.3)
HGB BLD-MCNC: 10.5 G/DL (ref 11.7–15.4)
IMM GRANULOCYTES # BLD: 0.1 K/UL (ref 0–0.5)
IMM GRANULOCYTES NFR BLD AUTO: 0.9 % (ref 0–5)
LYMPHOCYTES # BLD AUTO: 15 % (ref 13–44)
LYMPHOCYTES # BLD: 1.4 K/UL (ref 0.5–4.6)
MCH RBC QN AUTO: 31.8 PG (ref 26.1–32.9)
MCHC RBC AUTO-ENTMCNC: 32.6 G/DL (ref 31.4–35)
MCV RBC AUTO: 97.6 FL (ref 79.6–97.8)
MONOCYTES # BLD: 0.6 K/UL (ref 0.1–1.3)
MONOCYTES NFR BLD AUTO: 7 % (ref 4–12)
NEUTS SEG # BLD: 6.9 K/UL (ref 1.7–8.2)
NEUTS SEG NFR BLD AUTO: 75 % (ref 43–78)
PLATELET # BLD AUTO: 394 K/UL (ref 150–450)
PMV BLD AUTO: 10.6 FL (ref 10.8–14.1)
RBC # BLD AUTO: 3.3 M/UL (ref 4.05–5.25)
TROPONIN I BLD-MCNC: 0.2 NG/ML (ref 0–0.08)
WBC # BLD AUTO: 9.1 K/UL (ref 4.3–11.1)

## 2017-08-07 PROCEDURE — 74011250636 HC RX REV CODE- 250/636: Performed by: EMERGENCY MEDICINE

## 2017-08-07 PROCEDURE — 96361 HYDRATE IV INFUSION ADD-ON: CPT | Performed by: EMERGENCY MEDICINE

## 2017-08-07 PROCEDURE — 93005 ELECTROCARDIOGRAM TRACING: CPT | Performed by: EMERGENCY MEDICINE

## 2017-08-07 PROCEDURE — 99283 EMERGENCY DEPT VISIT LOW MDM: CPT | Performed by: EMERGENCY MEDICINE

## 2017-08-07 PROCEDURE — 71010 XR CHEST PORT: CPT

## 2017-08-07 PROCEDURE — 85379 FIBRIN DEGRADATION QUANT: CPT | Performed by: EMERGENCY MEDICINE

## 2017-08-07 PROCEDURE — 96365 THER/PROPH/DIAG IV INF INIT: CPT | Performed by: EMERGENCY MEDICINE

## 2017-08-07 PROCEDURE — 84484 ASSAY OF TROPONIN QUANT: CPT

## 2017-08-07 PROCEDURE — 85025 COMPLETE CBC W/AUTO DIFF WBC: CPT | Performed by: EMERGENCY MEDICINE

## 2017-08-07 RX ORDER — SODIUM CHLORIDE 9 MG/ML
150 INJECTION, SOLUTION INTRAVENOUS ONCE
Status: DISCONTINUED | OUTPATIENT
Start: 2017-08-07 | End: 2017-08-08

## 2017-08-07 RX ORDER — SODIUM CHLORIDE 9 MG/ML
500 INJECTION, SOLUTION INTRAVENOUS ONCE
Status: COMPLETED | OUTPATIENT
Start: 2017-08-07 | End: 2017-08-08

## 2017-08-07 RX ADMIN — SODIUM CHLORIDE 500 ML: 900 INJECTION, SOLUTION INTRAVENOUS at 22:28

## 2017-08-08 VITALS
WEIGHT: 150 LBS | BODY MASS INDEX: 26.58 KG/M2 | OXYGEN SATURATION: 100 % | HEIGHT: 63 IN | SYSTOLIC BLOOD PRESSURE: 120 MMHG | RESPIRATION RATE: 21 BRPM | DIASTOLIC BLOOD PRESSURE: 69 MMHG | TEMPERATURE: 98.5 F | HEART RATE: 80 BPM

## 2017-08-08 LAB
ALBUMIN SERPL BCP-MCNC: 2.5 G/DL (ref 3.2–4.6)
ALBUMIN/GLOB SERPL: 0.5 {RATIO} (ref 1.2–3.5)
ALP SERPL-CCNC: 78 U/L (ref 50–136)
ALT SERPL-CCNC: 32 U/L (ref 12–65)
ANION GAP BLD CALC-SCNC: 10 MMOL/L (ref 7–16)
APPEARANCE UR: CLEAR
AST SERPL W P-5'-P-CCNC: 42 U/L (ref 15–37)
ATRIAL RATE: 72 BPM
BACTERIA URNS QL MICRO: 0 /HPF
BILIRUB SERPL-MCNC: 0.5 MG/DL (ref 0.2–1.1)
BILIRUB UR QL: NEGATIVE
BUN SERPL-MCNC: 29 MG/DL (ref 8–23)
CALCIUM SERPL-MCNC: 8.9 MG/DL (ref 8.3–10.4)
CALCULATED P AXIS, ECG09: 83 DEGREES
CALCULATED R AXIS, ECG10: -98 DEGREES
CALCULATED T AXIS, ECG11: 81 DEGREES
CASTS URNS QL MICRO: 0 /LPF
CHLORIDE SERPL-SCNC: 101 MMOL/L (ref 98–107)
CO2 SERPL-SCNC: 25 MMOL/L (ref 21–32)
COLOR UR: YELLOW
CREAT SERPL-MCNC: 1.45 MG/DL (ref 0.6–1)
DIAGNOSIS, 93000: NORMAL
EPI CELLS #/AREA URNS HPF: ABNORMAL /HPF
GLOBULIN SER CALC-MCNC: 5.1 G/DL (ref 2.3–3.5)
GLUCOSE SERPL-MCNC: 130 MG/DL (ref 65–100)
GLUCOSE UR STRIP.AUTO-MCNC: NEGATIVE MG/DL
HGB UR QL STRIP: NEGATIVE
KETONES UR QL STRIP.AUTO: NEGATIVE MG/DL
LEUKOCYTE ESTERASE UR QL STRIP.AUTO: NEGATIVE
MAGNESIUM SERPL-MCNC: 1.5 MG/DL (ref 1.8–2.4)
NITRITE UR QL STRIP.AUTO: NEGATIVE
PH UR STRIP: 5.5 [PH] (ref 5–9)
POTASSIUM SERPL-SCNC: 3.7 MMOL/L (ref 3.5–5.1)
PROT SERPL-MCNC: 7.6 G/DL (ref 6.3–8.2)
PROT UR STRIP-MCNC: ABNORMAL MG/DL
Q-T INTERVAL, ECG07: 414 MS
QRS DURATION, ECG06: 174 MS
QTC CALCULATION (BEZET), ECG08: 465 MS
RBC #/AREA URNS HPF: ABNORMAL /HPF
SODIUM SERPL-SCNC: 136 MMOL/L (ref 136–145)
SP GR UR REFRACTOMETRY: 1.02 (ref 1–1.02)
TROPONIN I SERPL-MCNC: 0.25 NG/ML (ref 0.02–0.05)
TSH SERPL DL<=0.005 MIU/L-ACNC: 2.64 UIU/ML (ref 0.36–3.74)
UROBILINOGEN UR QL STRIP.AUTO: 1 EU/DL (ref 0.2–1)
VENTRICULAR RATE, ECG03: 76 BPM
WBC URNS QL MICRO: ABNORMAL /HPF

## 2017-08-08 PROCEDURE — 80053 COMPREHEN METABOLIC PANEL: CPT | Performed by: EMERGENCY MEDICINE

## 2017-08-08 PROCEDURE — 83735 ASSAY OF MAGNESIUM: CPT | Performed by: EMERGENCY MEDICINE

## 2017-08-08 PROCEDURE — 36415 COLL VENOUS BLD VENIPUNCTURE: CPT | Performed by: EMERGENCY MEDICINE

## 2017-08-08 PROCEDURE — 81003 URINALYSIS AUTO W/O SCOPE: CPT | Performed by: EMERGENCY MEDICINE

## 2017-08-08 PROCEDURE — 84484 ASSAY OF TROPONIN QUANT: CPT | Performed by: EMERGENCY MEDICINE

## 2017-08-08 PROCEDURE — 84443 ASSAY THYROID STIM HORMONE: CPT | Performed by: EMERGENCY MEDICINE

## 2017-08-08 PROCEDURE — 74011250637 HC RX REV CODE- 250/637

## 2017-08-08 PROCEDURE — 74011250636 HC RX REV CODE- 250/636

## 2017-08-08 RX ORDER — LANOLIN ALCOHOL/MO/W.PET/CERES
400 CREAM (GRAM) TOPICAL DAILY
Qty: 7 TAB | Refills: 0 | Status: SHIPPED | OUTPATIENT
Start: 2017-08-08

## 2017-08-08 RX ORDER — LANOLIN ALCOHOL/MO/W.PET/CERES
400 CREAM (GRAM) TOPICAL
Status: COMPLETED | OUTPATIENT
Start: 2017-08-08 | End: 2017-08-08

## 2017-08-08 RX ORDER — MAGNESIUM SULFATE HEPTAHYDRATE 40 MG/ML
2 INJECTION, SOLUTION INTRAVENOUS
Status: COMPLETED | OUTPATIENT
Start: 2017-08-08 | End: 2017-08-08

## 2017-08-08 RX ADMIN — Medication 400 MG: at 02:33

## 2017-08-08 RX ADMIN — MAGNESIUM SULFATE HEPTAHYDRATE 2 G: 40 INJECTION, SOLUTION INTRAVENOUS at 01:37

## 2017-08-08 NOTE — DISCHARGE INSTRUCTIONS
Learning About Saving Energy When You Have a Chronic Condition  Introduction  Everyday tasks can be tiring when you have COPD, heart failure, or another long-term (chronic) condition. You may feel at times that you've lost your ability to live your life. But learning to conserve, or save, your energy can help you be less tired. Conserving your energy means finding ways of doing daily activities with as little effort as possible. With some small changes in the way you do things, you can get your tasks done more easily. Some treatments are available that might help. Pulmonary rehabilitation can teach you ways to breathe easier. Cardiac rehabilitation can help make your heart stronger. You also may want to see an occupational or physical therapist. The therapist can give you more tips on building strength and moving with less effort. What can you do to conserve your energy? Planning  · Make a list of what you have to do every day. Group the tasks by location. · Do all the chores in one part of your house around the same time. · Go out for errands or do chores at the time of day when you have the most energy. · Plan rest periods into your day. Getting things done  · Sit down as often as you can when you get dressed, do chores, or cook. · Use a cart with wheels to roll items, such as laundry, from one room to another. · Push or slide boxes or other large items instead of lifting them. Reaching and bending  · Put things you use the most on shelves that are at the level of your waist or shoulder. · Use long-handled grabbers or other tools to reach items on a high shelf or to  things off the floor. Use long-handled dusters when you clean the house. · Use a raised toilet seat to avoid bending too far to sit or stand up. Eating  · Eat several small meals instead of three larger meals. · If you get too tired to eat much, try to choose healthy foods that have more calories.  Have a yogurt-and-fruit smoothie for breakfast. Put avocado on a sandwich. Or add cheese or peanut butter to snacks. · If you don't feel very hungry, try to eat first and drink water or other fluids later, after a meal. This can help keep you from losing weight. Sip small amounts of fluids if you need to drink while you eat. Having sex  · Choose the time of day when you have more energy. · A [id number] position for sex can be less tiring. Sometimes you may want to focus more on caressing. Watch closely for changes in your health, and be sure to contact your doctor if you have any problems. Where can you learn more? Go to http://billy-braulio.info/. Enter H190 in the search box to learn more about \"Learning About Saving Energy When You Have a Chronic Condition. \"  Current as of: March 25, 2017  Content Version: 11.3  © 6595-2032 [employer]. Care instructions adapted under license by [employer] (which disclaims liability or warranty for this information). If you have questions about a medical condition or this instruction, always ask your healthcare professional. [place] [address] any warranty or liability for your use of this information.

## 2017-08-08 NOTE — ED TRIAGE NOTES
Pt visiting from [place]. Has DM. Did not bring glucometer. Family bought one and bs 205. Pt has CHF and pacemaker. Was at [facility] last Tuesday.   Still feeling very bad

## 2017-08-08 NOTE — ED NOTES
Pt's daughter is concerned about pt not staying overnight for observation. Answered daughter's questions and offered to have hospitalist come to the room to see the pt again. Daughter states \"It's ok. We don't need to wait for him. \" Gave pt follow-up information per MD.

## 2017-08-08 NOTE — ED NOTES
Patient evaluated by hospitalist patient feeling better hospitalist did not feel admission was necessary at this time. Patient had some magnesium IV and also oral magnesium. Syndrome or prescription with using and she is to follow up closely with her primary care physician. Patient troponin has been consistently high and she's had heart Did not reveal obstruction of coronary system. Her renal function is somewhat previous tests.

## 2017-08-08 NOTE — ED NOTES
Hospitalist called to update on new resulted labs. States he is ok with pt being discharged at this time.

## 2017-08-08 NOTE — ED NOTES
I have reviewed discharge instructions with the caregiver. The caregiver verbalized understanding. Opportunity provided for questions and clarification. Pt exited to daughter's car via wheelchair with this nurse's help.

## 2017-08-08 NOTE — ED PROVIDER NOTES
HPI Comments: 60-year-old [facility] American female with a history of cardiomyopathy and very recent cardiac catheterization presents with continued fatigue. Seen recently at our [facility] campus and noted to have elevated troponins however recent catheter was normal.  Felt to be a viral syndrome. Patient since then has traveled to [place] and back here again but has continued and worsening fatigue and malaise. She complains of decreased urine output and dry mouth and has not been eating or drinking well. She denies any chest pain or abdominal pain. She has chronic trouble breathing and has had some upper respiratory symptoms as well as a productive cough. Patient recently finished a [employer]. Patient is a 73 y.o. female presenting with fatigue. The history is provided by the patient and a relative. Fatigue   This is a recurrent problem. The current episode started more than 1 week ago. The problem has not changed since onset. There was no focality noted. Pertinent negatives include no focal weakness, no loss of sensation, no loss of balance, no speech difficulty and no mental status change. There has been no fever. Associated symptoms include shortness of breath (chronic-\"but I have this all the time\") and nausea. Pertinent negatives include no chest pain, no vomiting and no headaches. Past Medical History:   Diagnosis Date    Arthritis     Atrial fibrillation ([address]     Cardiomyopathy ([address]     Chronic obstructive pulmonary disease (HCC)     COPD (chronic obstructive pulmonary disease) ([address]     Hypertension     Ill-defined condition     Anemia     Systolic heart failure (HCC)        Past Surgical History:   Procedure Laterality Date    HX IMPLANTABLE CARDIOVERTER DEFIBRILLATOR      HX ORTHOPAEDIC      right foot    HX ORTHOPAEDIC      right hip replacement     HX SHOULDER ARTHROSCOPY           History reviewed. No pertinent family history.     Social History     Social History    Marital status:      Spouse name: N/A    Number of children: N/A    Years of education: N/A     Occupational History    Not on file. Social History Main Topics    Smoking status: Former Smoker    Smokeless tobacco: Never Used    Alcohol use No    Drug use: Not on file    Sexual activity: Not on file     Other Topics Concern    Not on file     Social History Narrative         ALLERGIES: Aspirin    Review of Systems   Constitutional: Positive for fatigue. Negative for chills and fever. HENT: Positive for congestion and rhinorrhea. Negative for sore throat. Eyes: Negative for photophobia and redness. Respiratory: Positive for cough and shortness of breath (chronic-\"but I have this all the time\"). Cardiovascular: Negative for chest pain and leg swelling. Gastrointestinal: Positive for nausea. Negative for abdominal pain, blood in stool, constipation, diarrhea and vomiting. Endocrine: Negative for polydipsia and polyuria. Genitourinary: Positive for decreased urine volume. Negative for dysuria, frequency and urgency. Musculoskeletal: Negative for back pain and myalgias. Skin: Positive for color change ( pale). Negative for rash. Neurological: Negative for focal weakness, speech difficulty, weakness, numbness, headaches and loss of balance. Vitals:    08/07/17 2157   BP: 131/72   Pulse: 75   Resp: 16   Temp: 98.5 °F (36.9 °C)   SpO2: 100%   Weight: 68 kg (150 lb)   Height: 5' 3\" (1.6 m)            Physical Exam   Constitutional: She is oriented to person, place, and time. She appears well-developed. No distress. Ill but nontoxic appearing   HENT:   Mouth/Throat: No oropharyngeal exudate. Mucous membranes tacky but not dry   Eyes: Pupils are equal, round, and reactive to light. Pale conjunctiva   Neck: Normal range of motion. Neck supple. Cardiovascular: Normal rate, regular rhythm, normal heart sounds and intact distal pulses. No murmur heard.   Pulmonary/Chest: Breath sounds normal. No respiratory distress. Abdominal: Soft. She exhibits no distension. There is no tenderness. There is no rebound and no guarding. Musculoskeletal: Normal range of motion. She exhibits no edema or tenderness. Neurological: She is alert and oriented to person, place, and time. She has normal strength. No cranial nerve deficit or sensory deficit. She exhibits normal muscle tone. Coordination normal.   Skin: Skin is warm and dry. Nursing note and vitals reviewed. MDM  Number of Diagnoses or Management Options  Diagnosis management comments: EKG is a paced rhythm and unchanged. Recheck troponins and basic labs and check for acute kidney failure from dehydration. IV fluids ordered. X-ray to evaluate for pneumonia. Urine to check for UTI or pyelonephritis. 11:17 PM  Bedside ED ultrasound reveals possible small pericardial effusion. No large pericardial effusion or tamponade. Admit for obs and hydration by hospitalist.       Amount and/or Complexity of Data Reviewed  Clinical lab tests: ordered and reviewed  Tests in the radiology section of CPT®: ordered and reviewed      ED Course       Procedures         Arlynalise Pritchett 46        Name:  Abby Branham   MR#:  [id number]   :  1945   Account #:  [de-identified]   Date of Adm:  2017         This is a left heart catheterization, selective coronary   angiography, left ventriculogram. Patient presented with   abnormal troponins and congestive heart failure.      COMPLICATIONS: None.      ACCESS: Right radial. TIG4 and pigtail were used.      FINDINGS     Left ventriculogram done in LIZ projection shows EF of   approximately 35%, LVEDP of 20 with global hypokinesis, no   gradient on pullback.      Left main arises normally, bifurcates in a LAD and circumflex. The   left main is normal.      LAD courses to the apex, supplies 3 diagonals.  The LAD and   diagonals are angiographically normal and moderate sized.      Circumflex artery is a large dominant artery supplying 3 OMs and a   PDA. The circumflex system is angiographically normal.      Right coronary artery is a small nondominant vessel with minimal   circulation. The right is normal.      CONCLUSIONS: Normal coronary arteriography in a circumflex   dominant system with low ejection fraction of 35%.  Patient has   an indwelling BiV implantable cardioverter-defibrillator.            Ksenia Matthews MD

## 2017-08-08 NOTE — ED NOTES
Called lab about missing blood work. Lab stated that they need a recollect but this nurse was not notified. Asked lab to come collect blood work because pt is difficult stick.

## 2017-08-08 NOTE — ED NOTES
Assisted pt with bedpan. Pt unable to urinate. Assisted pt with bedside commode. Urine mixed with stool. Will try again for urine sample.

## 2023-09-18 NOTE — PROGRESS NOTES
Anesthesia Pre Eval Note    Anesthesia ROS/Med Hx        Anesthetic Complication History:  Patient does not have a history of anesthetic complications      Pulmonary Review:  Patient does not have a pulmonary history      Neuro/Psych Review:  Patient does not have a neuro/psych history       Cardiovascular Review:    Positive for past MI  Positive for CAD  Positive for hyperlipidemia    GI/HEPATIC/RENAL Review:    Positive for hepatitis Positive for liver disease     End/Other Review:    Positive for cancer      Relevant Problems   No relevant active problems       Physical Exam     Airway   Mallampati: II  TM Distance: >3 FB  Neck ROM: Full  Neck: Non-tender and Able to place in sniff position  TMJ Mobility: Good    Cardiovascular  Cardiovascular exam normal  Cardio Rhythm: Regular  Cardio Rate: Normal    Head Assessment  Head assessment: Normocephalic and Atraumatic    General Assessment  General Assessment: Alert and oriented and No acute distress    Dental Exam  Dental exam normal    Pulmonary Exam  Pulmonary exam normal  Breath sounds clear to auscultation:  Yes    Abdominal Exam  Abdominal exam normal      Anesthesia Plan:    ASA Status: 3  Anesthesia Type: General    Induction: Intravenous  Preferred Airway Type: LMA  Maintenance: Inhalational    Post-op Pain Management: Per Surgeon      Checklist  Reviewed: NPO Status, Allergies, Medications, Problem list and Past Med History  Consent/Risks Discussed Statement:  The proposed anesthetic plan, including its risks and benefits, have been discussed with the Patient along with the risks and benefits of alternatives. Questions were encouraged and answered and the patient and/or representative understands and agrees to proceed.        I discussed with the patient (and/or patient's legal representative) the risks and benefits of the proposed anesthesia plan, General, which may include services performed by other anesthesia providers.    Alternative anesthesia plans,  TRANSFER - IN REPORT:    Verbal report received from [place], [facility] on Umesh Lenin being received from [address] [place] [address] for routine post - op      Report consisted of patients Situation, Background, Assessment and Recommendations(SBAR). Information from the following report(s) SBAR was reviewed with the receiving nurse. Opportunity for questions and clarification was provided. Assessment completed upon patients arrival to unit and care assumed. if available, were reviewed with the patient (and/or patient's legal representative). Discussion has been held with the patient (and/or patient's legal representative) regarding risks of anesthesia, which include vomiting, nausea and dental injury and emergent situations that may require change in anesthesia plan.    The patient (and/or patient's legal representative) has indicated understanding, his/her questions have been answered, and he/she wishes to proceed with the planned anesthetic.    Blood Products: Not Anticipated    Comments  Plan Comments: Risks and benefits of GA discussed with patient.  Discussion included, but was not limited to, above plus postoperative pain and nausea.  Discussed that any and all measures necessary for care and safety of patient will be undertaken as needed.  Pt voiced understanding of discussion as laid out.  All questions answered to voiced satisfaction and understanding.